# Patient Record
Sex: FEMALE | Race: WHITE | NOT HISPANIC OR LATINO
[De-identification: names, ages, dates, MRNs, and addresses within clinical notes are randomized per-mention and may not be internally consistent; named-entity substitution may affect disease eponyms.]

---

## 2017-01-16 ENCOUNTER — FORM ENCOUNTER (OUTPATIENT)
Age: 72
End: 2017-01-16

## 2017-01-17 ENCOUNTER — APPOINTMENT (OUTPATIENT)
Dept: ULTRASOUND IMAGING | Facility: IMAGING CENTER | Age: 72
End: 2017-01-17

## 2017-01-17 ENCOUNTER — OUTPATIENT (OUTPATIENT)
Dept: OUTPATIENT SERVICES | Facility: HOSPITAL | Age: 72
LOS: 1 days | End: 2017-01-17
Payer: COMMERCIAL

## 2017-01-17 DIAGNOSIS — Z98.89 OTHER SPECIFIED POSTPROCEDURAL STATES: Chronic | ICD-10-CM

## 2017-01-17 DIAGNOSIS — C50.919 MALIGNANT NEOPLASM OF UNSPECIFIED SITE OF UNSPECIFIED FEMALE BREAST: ICD-10-CM

## 2017-01-17 PROCEDURE — 76641 ULTRASOUND BREAST COMPLETE: CPT

## 2017-02-06 ENCOUNTER — APPOINTMENT (OUTPATIENT)
Dept: SURGICAL ONCOLOGY | Facility: CLINIC | Age: 72
End: 2017-02-06

## 2017-02-06 VITALS
HEART RATE: 82 BPM | HEIGHT: 55 IN | SYSTOLIC BLOOD PRESSURE: 149 MMHG | BODY MASS INDEX: 21.98 KG/M2 | RESPIRATION RATE: 14 BRPM | DIASTOLIC BLOOD PRESSURE: 78 MMHG | WEIGHT: 95 LBS

## 2017-03-17 ENCOUNTER — OUTPATIENT (OUTPATIENT)
Dept: OUTPATIENT SERVICES | Facility: HOSPITAL | Age: 72
LOS: 1 days | Discharge: HOME | End: 2017-03-17

## 2017-03-17 DIAGNOSIS — Z98.89 OTHER SPECIFIED POSTPROCEDURAL STATES: Chronic | ICD-10-CM

## 2017-03-17 DIAGNOSIS — C50.919 MALIGNANT NEOPLASM OF UNSPECIFIED SITE OF UNSPECIFIED FEMALE BREAST: ICD-10-CM

## 2017-03-19 ENCOUNTER — RESULT REVIEW (OUTPATIENT)
Age: 72
End: 2017-03-19

## 2017-03-20 ENCOUNTER — APPOINTMENT (OUTPATIENT)
Dept: HEMATOLOGY ONCOLOGY | Facility: CLINIC | Age: 72
End: 2017-03-20

## 2017-03-20 VITALS
SYSTOLIC BLOOD PRESSURE: 120 MMHG | BODY MASS INDEX: 23.84 KG/M2 | OXYGEN SATURATION: 97 % | DIASTOLIC BLOOD PRESSURE: 86 MMHG | WEIGHT: 95.24 LBS | TEMPERATURE: 98.8 F | RESPIRATION RATE: 16 BRPM | HEART RATE: 81 BPM

## 2017-03-20 LAB
HCT VFR BLD CALC: 32.7 % — LOW (ref 34.5–45)
HGB BLD-MCNC: 11.4 G/DL — LOW (ref 11.5–15.5)
MCHC RBC-ENTMCNC: 33.1 PG — SIGNIFICANT CHANGE UP (ref 27–34)
MCHC RBC-ENTMCNC: 34.8 G/DL — SIGNIFICANT CHANGE UP (ref 32–36)
MCV RBC AUTO: 95.1 FL — SIGNIFICANT CHANGE UP (ref 80–100)
PLATELET # BLD AUTO: 237 K/UL — SIGNIFICANT CHANGE UP (ref 150–400)
RBC # BLD: 3.44 M/UL — LOW (ref 3.8–5.2)
RBC # FLD: 12.2 % — SIGNIFICANT CHANGE UP (ref 10.3–14.5)
WBC # BLD: 7.3 K/UL — SIGNIFICANT CHANGE UP (ref 3.8–10.5)
WBC # FLD AUTO: 7.3 K/UL — SIGNIFICANT CHANGE UP (ref 3.8–10.5)

## 2017-03-21 LAB
25(OH)D3 SERPL-MCNC: 62.1 NG/ML
ALBUMIN SERPL ELPH-MCNC: 4.2 G/DL
ALP BLD-CCNC: 46 U/L
ALT SERPL-CCNC: 19 U/L
ANION GAP SERPL CALC-SCNC: 15 MMOL/L
AST SERPL-CCNC: 19 U/L
BILIRUB SERPL-MCNC: 0.3 MG/DL
BUN SERPL-MCNC: 31 MG/DL
CALCIUM SERPL-MCNC: 9.6 MG/DL
CHLORIDE SERPL-SCNC: 102 MMOL/L
CO2 SERPL-SCNC: 24 MMOL/L
CREAT SERPL-MCNC: 0.59 MG/DL
GLUCOSE SERPL-MCNC: 110 MG/DL
POTASSIUM SERPL-SCNC: 4.3 MMOL/L
PROT SERPL-MCNC: 6.7 G/DL
SODIUM SERPL-SCNC: 141 MMOL/L

## 2017-07-26 ENCOUNTER — FORM ENCOUNTER (OUTPATIENT)
Age: 72
End: 2017-07-26

## 2017-07-27 ENCOUNTER — OUTPATIENT (OUTPATIENT)
Dept: OUTPATIENT SERVICES | Facility: HOSPITAL | Age: 72
LOS: 1 days | End: 2017-07-27
Payer: COMMERCIAL

## 2017-07-27 ENCOUNTER — APPOINTMENT (OUTPATIENT)
Dept: MAMMOGRAPHY | Facility: IMAGING CENTER | Age: 72
End: 2017-07-27
Payer: COMMERCIAL

## 2017-07-27 DIAGNOSIS — Z98.89 OTHER SPECIFIED POSTPROCEDURAL STATES: Chronic | ICD-10-CM

## 2017-07-27 DIAGNOSIS — C50.919 MALIGNANT NEOPLASM OF UNSPECIFIED SITE OF UNSPECIFIED FEMALE BREAST: ICD-10-CM

## 2017-07-27 PROCEDURE — 77063 BREAST TOMOSYNTHESIS BI: CPT | Mod: 26

## 2017-07-27 PROCEDURE — 77063 BREAST TOMOSYNTHESIS BI: CPT

## 2017-07-27 PROCEDURE — 77067 SCR MAMMO BI INCL CAD: CPT

## 2017-07-27 PROCEDURE — G0202: CPT | Mod: 26

## 2017-09-13 ENCOUNTER — OUTPATIENT (OUTPATIENT)
Dept: OUTPATIENT SERVICES | Facility: HOSPITAL | Age: 72
LOS: 1 days | Discharge: HOME | End: 2017-09-13

## 2017-09-13 DIAGNOSIS — Z98.89 OTHER SPECIFIED POSTPROCEDURAL STATES: Chronic | ICD-10-CM

## 2017-09-13 DIAGNOSIS — C50.919 MALIGNANT NEOPLASM OF UNSPECIFIED SITE OF UNSPECIFIED FEMALE BREAST: ICD-10-CM

## 2017-09-18 ENCOUNTER — APPOINTMENT (OUTPATIENT)
Dept: HEMATOLOGY ONCOLOGY | Facility: CLINIC | Age: 72
End: 2017-09-18
Payer: COMMERCIAL

## 2017-09-18 VITALS
HEART RATE: 71 BPM | DIASTOLIC BLOOD PRESSURE: 84 MMHG | OXYGEN SATURATION: 98 % | BODY MASS INDEX: 23.53 KG/M2 | WEIGHT: 93.98 LBS | RESPIRATION RATE: 16 BRPM | SYSTOLIC BLOOD PRESSURE: 144 MMHG | TEMPERATURE: 98.2 F

## 2017-09-18 PROCEDURE — 99214 OFFICE O/P EST MOD 30 MIN: CPT

## 2017-11-21 ENCOUNTER — MEDICATION RENEWAL (OUTPATIENT)
Age: 72
End: 2017-11-21

## 2017-12-07 ENCOUNTER — APPOINTMENT (OUTPATIENT)
Dept: FAMILY MEDICINE | Facility: CLINIC | Age: 72
End: 2017-12-07
Payer: COMMERCIAL

## 2017-12-07 VITALS
DIASTOLIC BLOOD PRESSURE: 82 MMHG | SYSTOLIC BLOOD PRESSURE: 112 MMHG | WEIGHT: 95 LBS | BODY MASS INDEX: 21.98 KG/M2 | HEIGHT: 55 IN

## 2017-12-07 PROCEDURE — 99213 OFFICE O/P EST LOW 20 MIN: CPT

## 2017-12-07 RX ORDER — IODOQUINOL, HYDROCORTISONE ACETATE AND ALOE VERA LEAF 10; 20; 10 MG/G; MG/G; MG/G
1-2-1 GEL TOPICAL
Qty: 48 | Refills: 0 | Status: ACTIVE | COMMUNITY
Start: 2017-08-02

## 2017-12-26 ENCOUNTER — MEDICATION RENEWAL (OUTPATIENT)
Age: 72
End: 2017-12-26

## 2017-12-29 ENCOUNTER — APPOINTMENT (OUTPATIENT)
Dept: FAMILY MEDICINE | Facility: CLINIC | Age: 72
End: 2017-12-29
Payer: COMMERCIAL

## 2017-12-29 VITALS
HEIGHT: 55 IN | WEIGHT: 93 LBS | DIASTOLIC BLOOD PRESSURE: 80 MMHG | SYSTOLIC BLOOD PRESSURE: 130 MMHG | BODY MASS INDEX: 21.52 KG/M2

## 2017-12-29 PROCEDURE — 99213 OFFICE O/P EST LOW 20 MIN: CPT

## 2018-01-30 ENCOUNTER — FORM ENCOUNTER (OUTPATIENT)
Age: 73
End: 2018-01-30

## 2018-01-31 ENCOUNTER — OUTPATIENT (OUTPATIENT)
Dept: OUTPATIENT SERVICES | Facility: HOSPITAL | Age: 73
LOS: 1 days | End: 2018-01-31
Payer: COMMERCIAL

## 2018-01-31 ENCOUNTER — APPOINTMENT (OUTPATIENT)
Dept: ULTRASOUND IMAGING | Facility: IMAGING CENTER | Age: 73
End: 2018-01-31
Payer: COMMERCIAL

## 2018-01-31 DIAGNOSIS — Z98.89 OTHER SPECIFIED POSTPROCEDURAL STATES: Chronic | ICD-10-CM

## 2018-01-31 DIAGNOSIS — C50.919 MALIGNANT NEOPLASM OF UNSPECIFIED SITE OF UNSPECIFIED FEMALE BREAST: ICD-10-CM

## 2018-01-31 PROCEDURE — 76641 ULTRASOUND BREAST COMPLETE: CPT | Mod: 26,50

## 2018-01-31 PROCEDURE — 76641 ULTRASOUND BREAST COMPLETE: CPT

## 2018-02-05 ENCOUNTER — APPOINTMENT (OUTPATIENT)
Dept: SURGICAL ONCOLOGY | Facility: CLINIC | Age: 73
End: 2018-02-05
Payer: COMMERCIAL

## 2018-02-05 VITALS
DIASTOLIC BLOOD PRESSURE: 80 MMHG | RESPIRATION RATE: 15 BRPM | WEIGHT: 93 LBS | HEART RATE: 93 BPM | SYSTOLIC BLOOD PRESSURE: 178 MMHG | HEIGHT: 55 IN | BODY MASS INDEX: 21.52 KG/M2

## 2018-02-05 PROCEDURE — 99213 OFFICE O/P EST LOW 20 MIN: CPT

## 2018-03-08 ENCOUNTER — OUTPATIENT (OUTPATIENT)
Dept: OUTPATIENT SERVICES | Facility: HOSPITAL | Age: 73
LOS: 1 days | Discharge: HOME | End: 2018-03-08

## 2018-03-08 DIAGNOSIS — C50.919 MALIGNANT NEOPLASM OF UNSPECIFIED SITE OF UNSPECIFIED FEMALE BREAST: ICD-10-CM

## 2018-03-08 DIAGNOSIS — Z98.89 OTHER SPECIFIED POSTPROCEDURAL STATES: Chronic | ICD-10-CM

## 2018-03-12 ENCOUNTER — APPOINTMENT (OUTPATIENT)
Dept: HEMATOLOGY ONCOLOGY | Facility: CLINIC | Age: 73
End: 2018-03-12
Payer: COMMERCIAL

## 2018-04-10 ENCOUNTER — OUTPATIENT (OUTPATIENT)
Dept: OUTPATIENT SERVICES | Facility: HOSPITAL | Age: 73
LOS: 1 days | Discharge: HOME | End: 2018-04-10

## 2018-04-10 DIAGNOSIS — C50.919 MALIGNANT NEOPLASM OF UNSPECIFIED SITE OF UNSPECIFIED FEMALE BREAST: ICD-10-CM

## 2018-04-10 DIAGNOSIS — Z98.89 OTHER SPECIFIED POSTPROCEDURAL STATES: Chronic | ICD-10-CM

## 2018-04-13 ENCOUNTER — APPOINTMENT (OUTPATIENT)
Dept: HEMATOLOGY ONCOLOGY | Facility: CLINIC | Age: 73
End: 2018-04-13
Payer: COMMERCIAL

## 2018-04-13 ENCOUNTER — RESULT REVIEW (OUTPATIENT)
Age: 73
End: 2018-04-13

## 2018-04-13 VITALS
OXYGEN SATURATION: 100 % | SYSTOLIC BLOOD PRESSURE: 182 MMHG | TEMPERATURE: 98.2 F | DIASTOLIC BLOOD PRESSURE: 76 MMHG | HEART RATE: 84 BPM | BODY MASS INDEX: 23.23 KG/M2 | WEIGHT: 92.81 LBS | RESPIRATION RATE: 16 BRPM

## 2018-04-13 LAB
HCT VFR BLD CALC: 32.6 % — LOW (ref 34.5–45)
HGB BLD-MCNC: 11.3 G/DL — LOW (ref 11.5–15.5)
MCHC RBC-ENTMCNC: 32.5 PG — SIGNIFICANT CHANGE UP (ref 27–34)
MCHC RBC-ENTMCNC: 34.7 G/DL — SIGNIFICANT CHANGE UP (ref 32–36)
MCV RBC AUTO: 93.5 FL — SIGNIFICANT CHANGE UP (ref 80–100)
PLATELET # BLD AUTO: 282 K/UL — SIGNIFICANT CHANGE UP (ref 150–400)
RBC # BLD: 3.49 M/UL — LOW (ref 3.8–5.2)
RBC # FLD: 12.8 % — SIGNIFICANT CHANGE UP (ref 10.3–14.5)
WBC # BLD: 5.5 K/UL — SIGNIFICANT CHANGE UP (ref 3.8–10.5)
WBC # FLD AUTO: 5.5 K/UL — SIGNIFICANT CHANGE UP (ref 3.8–10.5)

## 2018-04-13 PROCEDURE — 99214 OFFICE O/P EST MOD 30 MIN: CPT

## 2018-04-15 LAB
25(OH)D3 SERPL-MCNC: 59.3 NG/ML
ALBUMIN SERPL ELPH-MCNC: 4.5 G/DL
ALP BLD-CCNC: 48 U/L
ALT SERPL-CCNC: 26 U/L
ANION GAP SERPL CALC-SCNC: 13 MMOL/L
AST SERPL-CCNC: 23 U/L
BILIRUB SERPL-MCNC: 0.3 MG/DL
BUN SERPL-MCNC: 12 MG/DL
CALCIUM SERPL-MCNC: 10 MG/DL
CHLORIDE SERPL-SCNC: 99 MMOL/L
CO2 SERPL-SCNC: 26 MMOL/L
CREAT SERPL-MCNC: 0.65 MG/DL
GLUCOSE SERPL-MCNC: 99 MG/DL
POTASSIUM SERPL-SCNC: 4.6 MMOL/L
PROT SERPL-MCNC: 6.7 G/DL
SODIUM SERPL-SCNC: 138 MMOL/L

## 2018-05-03 ENCOUNTER — APPOINTMENT (OUTPATIENT)
Dept: OBGYN | Facility: CLINIC | Age: 73
End: 2018-05-03
Payer: COMMERCIAL

## 2018-05-03 VITALS
BODY MASS INDEX: 21.52 KG/M2 | DIASTOLIC BLOOD PRESSURE: 78 MMHG | WEIGHT: 93 LBS | SYSTOLIC BLOOD PRESSURE: 122 MMHG | HEIGHT: 55 IN

## 2018-05-03 PROCEDURE — 99397 PER PM REEVAL EST PAT 65+ YR: CPT

## 2018-07-11 ENCOUNTER — FORM ENCOUNTER (OUTPATIENT)
Age: 73
End: 2018-07-11

## 2018-07-12 ENCOUNTER — APPOINTMENT (OUTPATIENT)
Dept: MAMMOGRAPHY | Facility: IMAGING CENTER | Age: 73
End: 2018-07-12
Payer: COMMERCIAL

## 2018-07-12 ENCOUNTER — APPOINTMENT (OUTPATIENT)
Dept: ULTRASOUND IMAGING | Facility: IMAGING CENTER | Age: 73
End: 2018-07-12
Payer: COMMERCIAL

## 2018-07-12 ENCOUNTER — OUTPATIENT (OUTPATIENT)
Dept: OUTPATIENT SERVICES | Facility: HOSPITAL | Age: 73
LOS: 1 days | End: 2018-07-12
Payer: COMMERCIAL

## 2018-07-12 DIAGNOSIS — C50.919 MALIGNANT NEOPLASM OF UNSPECIFIED SITE OF UNSPECIFIED FEMALE BREAST: ICD-10-CM

## 2018-07-12 DIAGNOSIS — Z98.89 OTHER SPECIFIED POSTPROCEDURAL STATES: Chronic | ICD-10-CM

## 2018-07-12 PROCEDURE — 77063 BREAST TOMOSYNTHESIS BI: CPT

## 2018-07-12 PROCEDURE — 77067 SCR MAMMO BI INCL CAD: CPT | Mod: 26

## 2018-07-12 PROCEDURE — 77063 BREAST TOMOSYNTHESIS BI: CPT | Mod: 26

## 2018-07-12 PROCEDURE — G0279: CPT

## 2018-07-12 PROCEDURE — 77067 SCR MAMMO BI INCL CAD: CPT

## 2018-07-12 PROCEDURE — 77066 DX MAMMO INCL CAD BI: CPT

## 2018-07-12 PROCEDURE — 76642 ULTRASOUND BREAST LIMITED: CPT | Mod: 26,RT

## 2018-07-12 PROCEDURE — 76642 ULTRASOUND BREAST LIMITED: CPT

## 2018-08-01 ENCOUNTER — APPOINTMENT (OUTPATIENT)
Dept: ULTRASOUND IMAGING | Facility: IMAGING CENTER | Age: 73
End: 2018-08-01

## 2018-10-09 ENCOUNTER — OUTPATIENT (OUTPATIENT)
Dept: OUTPATIENT SERVICES | Facility: HOSPITAL | Age: 73
LOS: 1 days | Discharge: HOME | End: 2018-10-09

## 2018-10-09 DIAGNOSIS — Z98.89 OTHER SPECIFIED POSTPROCEDURAL STATES: Chronic | ICD-10-CM

## 2018-10-09 DIAGNOSIS — C50.919 MALIGNANT NEOPLASM OF UNSPECIFIED SITE OF UNSPECIFIED FEMALE BREAST: ICD-10-CM

## 2018-10-17 ENCOUNTER — RESULT REVIEW (OUTPATIENT)
Age: 73
End: 2018-10-17

## 2018-10-17 ENCOUNTER — APPOINTMENT (OUTPATIENT)
Dept: HEMATOLOGY ONCOLOGY | Facility: CLINIC | Age: 73
End: 2018-10-17
Payer: COMMERCIAL

## 2018-10-17 VITALS
DIASTOLIC BLOOD PRESSURE: 87 MMHG | WEIGHT: 91.49 LBS | TEMPERATURE: 97.5 F | SYSTOLIC BLOOD PRESSURE: 138 MMHG | BODY MASS INDEX: 22.9 KG/M2 | RESPIRATION RATE: 16 BRPM

## 2018-10-17 LAB
HCT VFR BLD CALC: 34.1 % — LOW (ref 34.5–45)
HGB BLD-MCNC: 11.6 G/DL — SIGNIFICANT CHANGE UP (ref 11.5–15.5)
MCHC RBC-ENTMCNC: 31.6 PG — SIGNIFICANT CHANGE UP (ref 27–34)
MCHC RBC-ENTMCNC: 34.1 G/DL — SIGNIFICANT CHANGE UP (ref 32–36)
MCV RBC AUTO: 92.7 FL — SIGNIFICANT CHANGE UP (ref 80–100)
PLATELET # BLD AUTO: 325 K/UL — SIGNIFICANT CHANGE UP (ref 150–400)
RBC # BLD: 3.68 M/UL — LOW (ref 3.8–5.2)
RBC # FLD: 12.8 % — SIGNIFICANT CHANGE UP (ref 10.3–14.5)
WBC # BLD: 6.8 K/UL — SIGNIFICANT CHANGE UP (ref 3.8–10.5)
WBC # FLD AUTO: 6.8 K/UL — SIGNIFICANT CHANGE UP (ref 3.8–10.5)

## 2018-10-17 PROCEDURE — 99214 OFFICE O/P EST MOD 30 MIN: CPT

## 2018-10-17 RX ORDER — AZITHROMYCIN 250 MG/1
250 TABLET, FILM COATED ORAL
Qty: 1 | Refills: 0 | Status: DISCONTINUED | COMMUNITY
Start: 2017-12-29 | End: 2018-10-17

## 2018-10-17 RX ORDER — PREDNISONE 20 MG/1
20 TABLET ORAL
Qty: 18 | Refills: 0 | Status: DISCONTINUED | COMMUNITY
Start: 2017-12-29 | End: 2018-10-17

## 2018-10-17 RX ORDER — BENZONATATE 200 MG/1
200 CAPSULE ORAL 3 TIMES DAILY
Qty: 30 | Refills: 0 | Status: DISCONTINUED | COMMUNITY
Start: 2017-12-07 | End: 2018-10-17

## 2018-10-17 RX ORDER — MOMETASONE FUROATE 220 UG/1
220 INHALANT RESPIRATORY (INHALATION) DAILY
Qty: 1 | Refills: 0 | Status: DISCONTINUED | COMMUNITY
Start: 2017-12-07 | End: 2018-10-17

## 2018-10-19 LAB
25(OH)D3 SERPL-MCNC: 55 NG/ML
ALBUMIN SERPL ELPH-MCNC: 4.3 G/DL
ALP BLD-CCNC: 55 U/L
ALT SERPL-CCNC: 41 U/L
ANION GAP SERPL CALC-SCNC: 17 MMOL/L
AST SERPL-CCNC: 38 U/L
BILIRUB SERPL-MCNC: 0.3 MG/DL
BUN SERPL-MCNC: 11 MG/DL
CALCIUM SERPL-MCNC: 9.4 MG/DL
CHLORIDE SERPL-SCNC: 92 MMOL/L
CO2 SERPL-SCNC: 23 MMOL/L
CREAT SERPL-MCNC: 0.56 MG/DL
GLUCOSE SERPL-MCNC: 136 MG/DL
POTASSIUM SERPL-SCNC: 4.4 MMOL/L
PROT SERPL-MCNC: 6.5 G/DL
SODIUM SERPL-SCNC: 132 MMOL/L

## 2018-11-18 ENCOUNTER — APPOINTMENT (OUTPATIENT)
Dept: FAMILY MEDICINE | Facility: CLINIC | Age: 73
End: 2018-11-18
Payer: COMMERCIAL

## 2018-11-18 VITALS
WEIGHT: 91 LBS | DIASTOLIC BLOOD PRESSURE: 80 MMHG | HEIGHT: 55 IN | BODY MASS INDEX: 21.06 KG/M2 | SYSTOLIC BLOOD PRESSURE: 120 MMHG

## 2018-11-18 PROCEDURE — G0008: CPT

## 2018-11-18 PROCEDURE — 99213 OFFICE O/P EST LOW 20 MIN: CPT | Mod: 25

## 2018-11-18 PROCEDURE — 90686 IIV4 VACC NO PRSV 0.5 ML IM: CPT

## 2018-11-18 RX ORDER — LOSARTAN POTASSIUM 25 MG/1
25 TABLET, FILM COATED ORAL
Refills: 0 | Status: DISCONTINUED | COMMUNITY
Start: 2018-10-17 | End: 2018-11-18

## 2019-02-04 ENCOUNTER — APPOINTMENT (OUTPATIENT)
Dept: SURGICAL ONCOLOGY | Facility: CLINIC | Age: 74
End: 2019-02-04
Payer: COMMERCIAL

## 2019-02-04 VITALS
DIASTOLIC BLOOD PRESSURE: 79 MMHG | WEIGHT: 90 LBS | OXYGEN SATURATION: 98 % | HEIGHT: 55 IN | HEART RATE: 89 BPM | SYSTOLIC BLOOD PRESSURE: 142 MMHG | BODY MASS INDEX: 20.83 KG/M2

## 2019-02-04 PROCEDURE — 99213 OFFICE O/P EST LOW 20 MIN: CPT

## 2019-02-04 NOTE — PHYSICAL EXAM
[Normal] : supple, no neck mass and thyroid not enlarged [Normal Neck Lymph Nodes] : normal neck lymph nodes  [Normal Supraclavicular Lymph Nodes] : normal supraclavicular lymph nodes [Normal Axillary Lymph Nodes] : normal axillary lymph nodes [Normal] : normal appearance, no rash, nodules, vesicles, ulcers, erythema [de-identified] : groins not examined [de-identified] : below

## 2019-02-04 NOTE — REVIEW OF SYSTEMS
[Negative] : Integumentary [FreeTextEntry5] : hypertension [FreeTextEntry6] : Asthma [FreeTextEntry7] : penicillin allergy [de-identified] : DM [FreeTextEntry1] : Breast cancer

## 2019-02-04 NOTE — HISTORY OF PRESENT ILLNESS
[de-identified] : 73 year-old nulliparous lady who we see for followup of breast cancer.\par \par She is adopted, and family history is not available\par \par 2000 she had left breast conserving surgery for stage I carcinoma by [facility], followed by radiation at our facility, and anti-estrogen therapy under the care of Dr Tamayo.\par \par She developed a left axillary recurrence in August 2014 which I resected, and was followed by radiation under the guidance of Dr. Shaila Basilio.\par \par She was seen by Dr. Kostas Marrero - On anastrozole.\par October 2018 followup visit  Janes was unremarkable\par \par November 2003: LCIS and ADH on a right breast biopsy\par \par \par Her internist is Dr. Cale Michelle.\par \par No pacemaker or defibrillator\par \par Presently on anastrozole\par Asthma is managed by her PMD with Asmanex Ventolin, and Singulair.\par Daily baby aspirin.\par Amlodipine for hypertension.\par Hypercholesterolemia treated with Lipitor.\par DIABETES controlled with Janumet\par \par Her gynecologist is Dr. Nuzhat Smiley, a visit in May 2018 was unremarkable.\par \par January 2017 she had a colonoscopy which was normal, by Dr. Rebel Xavier\par \par CC:none

## 2019-04-30 ENCOUNTER — OUTPATIENT (OUTPATIENT)
Dept: OUTPATIENT SERVICES | Facility: HOSPITAL | Age: 74
LOS: 1 days | Discharge: HOME | End: 2019-04-30

## 2019-04-30 DIAGNOSIS — Z98.89 OTHER SPECIFIED POSTPROCEDURAL STATES: Chronic | ICD-10-CM

## 2019-04-30 DIAGNOSIS — C50.919 MALIGNANT NEOPLASM OF UNSPECIFIED SITE OF UNSPECIFIED FEMALE BREAST: ICD-10-CM

## 2019-05-03 ENCOUNTER — APPOINTMENT (OUTPATIENT)
Dept: HEMATOLOGY ONCOLOGY | Facility: CLINIC | Age: 74
End: 2019-05-03
Payer: COMMERCIAL

## 2019-05-03 VITALS
OXYGEN SATURATION: 98 % | TEMPERATURE: 98.1 F | DIASTOLIC BLOOD PRESSURE: 81 MMHG | SYSTOLIC BLOOD PRESSURE: 143 MMHG | RESPIRATION RATE: 16 BRPM | WEIGHT: 89.29 LBS | HEART RATE: 80 BPM | BODY MASS INDEX: 22.35 KG/M2

## 2019-05-03 PROCEDURE — 99214 OFFICE O/P EST MOD 30 MIN: CPT

## 2019-05-03 RX ORDER — AMLODIPINE BESYLATE 2.5 MG/1
2.5 TABLET ORAL
Refills: 0 | Status: DISCONTINUED | COMMUNITY
Start: 2018-11-18 | End: 2019-05-03

## 2019-05-03 NOTE — HISTORY OF PRESENT ILLNESS
[Disease: _____________________] : Disease: [unfilled] [AJCC Stage: ____] : AJCC Stage: [unfilled] [de-identified] : She was initially evaluated in 10/2014 for left breast cancer.  She had a history of multifocal lobular carcinoma in situ and had left lumpectomy with axillary lymph node dissection in 4/25/2000.  She received radiation to the breast at [facility] and was treated with tamoxifen for 5 years with Dr Tamayo in [place].  On 7/2014, she had abnormal mammogram findings and she had FNA performed at the 1:00 axillary nodule which was positive for malignant cells.  On 8/29/2014, she had had left lymph node biopsy  that showed 1/2 lymph nodes positive for disease that was ER 90%, AR <5%, Jyf8eel negative.  She had PET/ CT performed on 10/2014 and MRI of the breast which showed post surgical changes to the left axilla but could not identify disease focus.  We reviewed chemotherapy options for LN positive breast cancer which she refused.  We offered her endocrine therapy and referred to radiation evaluation.  She completed radiation with Dr Basilio and started on anastrozole since 1/2015.  [de-identified] : ER >90%, NE <5%, Ido9poc negative [de-identified] : Anastrozole 1/2015 to present  [de-identified] : She denies any new breast or chest wall changes. She will be getting mammogram/ sonogram in July. Will also get bone density in July. She denies any new joint pain or worsening stiffness. She tolerates anastrozole and willing to continue with therapy. Her amlodipine has been increased to 5mg: mild ankle swelling. No new medications or health issues. She had blood work done with endocrine with LFTs WNL.

## 2019-05-03 NOTE — ASSESSMENT
[FreeTextEntry1] : She is a 72 y/o F with recurrent left axillary LN breast cancer s/p resection and radiation. She has been on anastrozole since 1/2015 without any new signs or symptoms of breast cancer recurrence. She will get bone density done with her endocrinologist at [facility] and mammo/ sono in July. Next follow up in 6 months.

## 2019-05-03 NOTE — PHYSICAL EXAM
[Fully active, able to carry on all pre-disease performance without restriction] : Status 0 - Fully active, able to carry on all pre-disease performance without restriction [Normal] : affect appropriate [de-identified] : inspiratory wheeze R; clear no crackles  [de-identified] : no abnl masses palpable or axillary LN palpable

## 2019-07-15 ENCOUNTER — TRANSCRIPTION ENCOUNTER (OUTPATIENT)
Age: 74
End: 2019-07-15

## 2019-07-16 ENCOUNTER — FORM ENCOUNTER (OUTPATIENT)
Age: 74
End: 2019-07-16

## 2019-07-17 ENCOUNTER — APPOINTMENT (OUTPATIENT)
Dept: MAMMOGRAPHY | Facility: IMAGING CENTER | Age: 74
End: 2019-07-17
Payer: COMMERCIAL

## 2019-07-17 ENCOUNTER — OUTPATIENT (OUTPATIENT)
Dept: OUTPATIENT SERVICES | Facility: HOSPITAL | Age: 74
LOS: 1 days | End: 2019-07-17
Payer: COMMERCIAL

## 2019-07-17 ENCOUNTER — APPOINTMENT (OUTPATIENT)
Dept: ULTRASOUND IMAGING | Facility: IMAGING CENTER | Age: 74
End: 2019-07-17
Payer: COMMERCIAL

## 2019-07-17 DIAGNOSIS — C50.919 MALIGNANT NEOPLASM OF UNSPECIFIED SITE OF UNSPECIFIED FEMALE BREAST: ICD-10-CM

## 2019-07-17 DIAGNOSIS — Z98.89 OTHER SPECIFIED POSTPROCEDURAL STATES: Chronic | ICD-10-CM

## 2019-07-17 PROCEDURE — 77067 SCR MAMMO BI INCL CAD: CPT

## 2019-07-17 PROCEDURE — 77067 SCR MAMMO BI INCL CAD: CPT | Mod: 26

## 2019-07-17 PROCEDURE — 76641 ULTRASOUND BREAST COMPLETE: CPT | Mod: 26,50

## 2019-07-17 PROCEDURE — 76641 ULTRASOUND BREAST COMPLETE: CPT

## 2019-07-17 PROCEDURE — 77063 BREAST TOMOSYNTHESIS BI: CPT | Mod: 26

## 2019-07-17 PROCEDURE — 77063 BREAST TOMOSYNTHESIS BI: CPT

## 2019-07-17 PROCEDURE — 77066 DX MAMMO INCL CAD BI: CPT

## 2019-07-17 PROCEDURE — G0279: CPT

## 2019-09-11 ENCOUNTER — TRANSCRIPTION ENCOUNTER (OUTPATIENT)
Age: 74
End: 2019-09-11

## 2019-10-02 ENCOUNTER — OUTPATIENT (OUTPATIENT)
Dept: OUTPATIENT SERVICES | Facility: HOSPITAL | Age: 74
LOS: 1 days | Discharge: HOME | End: 2019-10-02

## 2019-10-02 DIAGNOSIS — Z98.89 OTHER SPECIFIED POSTPROCEDURAL STATES: Chronic | ICD-10-CM

## 2019-10-02 DIAGNOSIS — C50.919 MALIGNANT NEOPLASM OF UNSPECIFIED SITE OF UNSPECIFIED FEMALE BREAST: ICD-10-CM

## 2019-10-04 ENCOUNTER — APPOINTMENT (OUTPATIENT)
Dept: HEMATOLOGY ONCOLOGY | Facility: CLINIC | Age: 74
End: 2019-10-04
Payer: COMMERCIAL

## 2019-10-04 VITALS
WEIGHT: 89.29 LBS | BODY MASS INDEX: 22.35 KG/M2 | OXYGEN SATURATION: 96 % | RESPIRATION RATE: 16 BRPM | DIASTOLIC BLOOD PRESSURE: 73 MMHG | SYSTOLIC BLOOD PRESSURE: 113 MMHG | TEMPERATURE: 98.3 F | HEART RATE: 82 BPM

## 2019-10-04 PROCEDURE — 99213 OFFICE O/P EST LOW 20 MIN: CPT

## 2019-10-04 NOTE — ASSESSMENT
[FreeTextEntry1] : She is a 74 y/o F with recurrent left axillary LN breast cancer s/p resection and radiation. She has been on anastrozole since 1/2015 without any new signs or symptoms of breast cancer recurrence. We reviewed her bone density which is stable. She will continue with calcium and Vitamin D with exercise to maintain bone health. She will continue with anastrozole. Next follow up in 6 months.

## 2019-10-04 NOTE — PHYSICAL EXAM
[Fully active, able to carry on all pre-disease performance without restriction] : Status 0 - Fully active, able to carry on all pre-disease performance without restriction [Normal] : affect appropriate [de-identified] : inspiratory wheeze R; clear no crackles  [de-identified] : no abnl masses palpable or axillary LN palpable

## 2019-10-04 NOTE — HISTORY OF PRESENT ILLNESS
[Disease: _____________________] : Disease: [unfilled] [AJCC Stage: ____] : AJCC Stage: [unfilled] [de-identified] : ER >90%, NV <5%, Clg8twg negative [de-identified] : She was initially evaluated in 10/2014 for left breast cancer.  She had a history of multifocal lobular carcinoma in situ and had left lumpectomy with axillary lymph node dissection in 4/25/2000.  She received radiation to the breast at [facility] and was treated with tamoxifen for 5 years with Dr Tamayo in [place].  On 7/2014, she had abnormal mammogram findings and she had FNA performed at the 1:00 axillary nodule which was positive for malignant cells.  On 8/29/2014, she had had left lymph node biopsy  that showed 1/2 lymph nodes positive for disease that was ER 90%, NH <5%, Dxm2npc negative.  She had PET/ CT performed on 10/2014 and MRI of the breast which showed post surgical changes to the left axilla but could not identify disease focus.  We reviewed chemotherapy options for LN positive breast cancer which she refused.  We offered her endocrine therapy and referred to radiation evaluation.  She completed radiation with Dr Basilio and started on anastrozole since 1/2015.  [de-identified] : Anastrozole 1/2015 to present  [de-identified] : She continues to tolerate anastrozole well. She denies any new breast changes. She is up to date with breast imaging and follow up with Dr Rehman. She also had bone density done recently which shows stable osteopenia. She denies any new medications.

## 2019-10-29 NOTE — ASSESSMENT
[FreeTextEntry1] : October 2014 breast MRI and PET scan were unremarkable.\par \par Annual breast imaging will be July 2019, prescription entered today for our location.\par \par If asymptomatic, with normal imaging, we will see her in another year, sooner if needed
complains of pain/discomfort

## 2020-01-09 ENCOUNTER — APPOINTMENT (OUTPATIENT)
Dept: OBGYN | Facility: CLINIC | Age: 75
End: 2020-01-09
Payer: COMMERCIAL

## 2020-01-09 ENCOUNTER — LABORATORY RESULT (OUTPATIENT)
Age: 75
End: 2020-01-09

## 2020-01-09 VITALS
DIASTOLIC BLOOD PRESSURE: 76 MMHG | WEIGHT: 85 LBS | HEIGHT: 55 IN | SYSTOLIC BLOOD PRESSURE: 121 MMHG | BODY MASS INDEX: 19.67 KG/M2

## 2020-01-09 DIAGNOSIS — Z01.419 ENCOUNTER FOR GYNECOLOGICAL EXAMINATION (GENERAL) (ROUTINE) W/OUT ABNORMAL FINDINGS: ICD-10-CM

## 2020-01-09 PROCEDURE — 99397 PER PM REEVAL EST PAT 65+ YR: CPT

## 2020-01-09 NOTE — PHYSICAL EXAM
[Awake] : awake [Alert] : alert [Acute Distress] : no acute distress [Nipple Discharge] : no nipple discharge [Mass] : no breast mass [Axillary LAD] : no axillary lymphadenopathy [Soft] : soft [Oriented x3] : oriented to person, place, and time [Distended] : not distended [Tender] : non tender [Depressed Mood] : not depressed [Flat Affect] : affect not flat [Normal] : cervix [No Bleeding] : there was no active vaginal bleeding [Uterine Adnexae] : were not tender and not enlarged [Ovarian Mass (___ Cm)] : there were no adnexal masses [Adnexa Tenderness] : were not tender [CTAB] : CTAB [RRR, No Murmurs] : RRR, no murmurs

## 2020-02-13 ENCOUNTER — APPOINTMENT (OUTPATIENT)
Dept: SURGICAL ONCOLOGY | Facility: CLINIC | Age: 75
End: 2020-02-13
Payer: COMMERCIAL

## 2020-02-13 VITALS
BODY MASS INDEX: 20.37 KG/M2 | WEIGHT: 88 LBS | HEIGHT: 55 IN | RESPIRATION RATE: 13 BRPM | DIASTOLIC BLOOD PRESSURE: 86 MMHG | SYSTOLIC BLOOD PRESSURE: 179 MMHG | HEART RATE: 78 BPM

## 2020-02-13 DIAGNOSIS — R22.30 LOCALIZED SWELLING, MASS AND LUMP, UNSPECIFIED UPPER LIMB: ICD-10-CM

## 2020-02-13 PROCEDURE — 99214 OFFICE O/P EST MOD 30 MIN: CPT

## 2020-02-13 NOTE — REVIEW OF SYSTEMS
[Negative] : Integumentary [de-identified] : Diabetes [FreeTextEntry6] : asthma [FreeTextEntry5] : Hypertension [FreeTextEntry1] : breast cancer

## 2020-02-13 NOTE — HISTORY OF PRESENT ILLNESS
[de-identified] : 74 year-old nulliparous lady who we see for followup of LEFT BREAST CANCER\par \par She is adopted, and family history is not available\par \par \par 2000 she had left breast conserving surgery for stage I carcinoma by [facility], followed by radiation at our facility, and anti-estrogen therapy under the care of Dr Tamayo.\par \par She developed a left axillary recurrence in August 2014 which I resected, and was followed by radiation under the guidance of Dr. Shaila Basilio.\par \par She was seen by Dr. Kostas Marrero - On anastrozole.\par October 2019 followup visit  Janes was unremarkable\par \par November 2003: LCIS and ADH on a right breast biopsy\par \par \par Her internist is Dr. Cale PADILLA.\par \par No pacemaker or defibrillator\par Daily baby aspirin.\par \par Presently on anastrozole\par \par Asthma is managed by her PMD with Asmanex Ventolin, and Singulair.\par \par Amlodipine for hypertension.\par Hypercholesterolemia treated with Lipitor.\par Her cardiologist is Dr. Briana Mario\par \par DIABETES controlled with Janumet\par Her endocrinologist is Dr. Jennifer Castillo\par \par \par Her gynecologist is Dr. Nuzhat JEFFERSON, a visit in January 2020 was unremarkable.\par \par January 2017 she had a colonoscopy which was normal, by Dr. Rebel Xavier\par \par CC:Since October 2016 she has felt an asymptomatic, unchanged, "pea-sized" rubbery lump on the top of her right shoulder.\par I did not see or feel an abnormality on my examination.\par I offered to image the area with plain films and sonography, she is in agreement, but would like to wait until her breast imaging this summer; prescriptions entered.\par No other specific or constitutional signs or symptoms.

## 2020-02-13 NOTE — ASSESSMENT
[FreeTextEntry1] : October 2014 breast MRI and PET scan were unremarkable.\par \par July 2019:\par Annual, bilateral, mammogram and sonogram at 450: BI-RADS 2.\par Prescription entered for July 2020.\par \par Clinically doing well.\par \par If no problems we should see her in another year, sooner if needed

## 2020-02-13 NOTE — PHYSICAL EXAM
[Normal] : supple, no neck mass and thyroid not enlarged [Normal Neck Lymph Nodes] : normal neck lymph nodes  [Normal Supraclavicular Lymph Nodes] : normal supraclavicular lymph nodes [Normal Axillary Lymph Nodes] : normal axillary lymph nodes [Normal] : normal appearance, no rash, nodules, vesicles, ulcers, erythema [de-identified] : below [de-identified] : groins not examined

## 2020-03-17 ENCOUNTER — RX RENEWAL (OUTPATIENT)
Age: 75
End: 2020-03-17

## 2020-03-30 ENCOUNTER — APPOINTMENT (OUTPATIENT)
Dept: SURGICAL ONCOLOGY | Facility: CLINIC | Age: 75
End: 2020-03-30

## 2020-04-06 ENCOUNTER — RX RENEWAL (OUTPATIENT)
Age: 75
End: 2020-04-06

## 2020-04-15 ENCOUNTER — APPOINTMENT (OUTPATIENT)
Dept: FAMILY MEDICINE | Facility: CLINIC | Age: 75
End: 2020-04-15
Payer: COMMERCIAL

## 2020-04-15 DIAGNOSIS — J00 ACUTE NASOPHARYNGITIS [COMMON COLD]: ICD-10-CM

## 2020-04-15 DIAGNOSIS — R05 COUGH: ICD-10-CM

## 2020-04-15 PROCEDURE — 99213 OFFICE O/P EST LOW 20 MIN: CPT | Mod: 95

## 2020-04-15 NOTE — PHYSICAL EXAM
[No Acute Distress] : no acute distress [Well Nourished] : well nourished [Well Developed] : well developed [Well-Appearing] : well-appearing [No JVD] : no jugular venous distention [No Respiratory Distress] : no respiratory distress  [Normal Affect] : the affect was normal [Normal Insight/Judgement] : insight and judgment were intact

## 2020-04-15 NOTE — HISTORY OF PRESENT ILLNESS
[Home] : at home, [unfilled] , at the time of the visit. [Medical Office: ([facility])___] : at the medical office located in  [Patient] : the patient [Self] : self [FreeTextEntry8] : 74 year old female here with complaints of having a cough, usually associated with allergy season. Patients active medications, allergies and issues were all reviewed with the patient at time of visit.\par

## 2020-04-15 NOTE — ASSESSMENT
[FreeTextEntry1] : patient with seasonal allergies associated with cough\par does well with inhaler in the past\par will renew for patient\par if no improvement, rto

## 2020-05-26 ENCOUNTER — RX RENEWAL (OUTPATIENT)
Age: 75
End: 2020-05-26

## 2020-06-16 ENCOUNTER — RX RENEWAL (OUTPATIENT)
Age: 75
End: 2020-06-16

## 2020-07-13 ENCOUNTER — RX RENEWAL (OUTPATIENT)
Age: 75
End: 2020-07-13

## 2020-07-15 ENCOUNTER — OUTPATIENT (OUTPATIENT)
Dept: OUTPATIENT SERVICES | Facility: HOSPITAL | Age: 75
LOS: 1 days | Discharge: HOME | End: 2020-07-15

## 2020-07-15 DIAGNOSIS — Z98.89 OTHER SPECIFIED POSTPROCEDURAL STATES: Chronic | ICD-10-CM

## 2020-07-15 DIAGNOSIS — C50.919 MALIGNANT NEOPLASM OF UNSPECIFIED SITE OF UNSPECIFIED FEMALE BREAST: ICD-10-CM

## 2020-07-17 ENCOUNTER — APPOINTMENT (OUTPATIENT)
Dept: HEMATOLOGY ONCOLOGY | Facility: CLINIC | Age: 75
End: 2020-07-17
Payer: COMMERCIAL

## 2020-07-17 ENCOUNTER — RESULT REVIEW (OUTPATIENT)
Age: 75
End: 2020-07-17

## 2020-07-17 VITALS
SYSTOLIC BLOOD PRESSURE: 187 MMHG | WEIGHT: 85.54 LBS | HEART RATE: 64 BPM | RESPIRATION RATE: 16 BRPM | DIASTOLIC BLOOD PRESSURE: 99 MMHG | OXYGEN SATURATION: 98 % | BODY MASS INDEX: 21.41 KG/M2 | TEMPERATURE: 98.1 F

## 2020-07-17 LAB
25(OH)D3 SERPL-MCNC: 44.8 NG/ML
ALBUMIN SERPL ELPH-MCNC: 4.7 G/DL
ALP BLD-CCNC: 52 U/L
ALT SERPL-CCNC: 20 U/L
ANION GAP SERPL CALC-SCNC: 14 MMOL/L
AST SERPL-CCNC: 21 U/L
BASOPHILS # BLD AUTO: 0.05 K/UL — SIGNIFICANT CHANGE UP (ref 0–0.2)
BASOPHILS NFR BLD AUTO: 1 % — SIGNIFICANT CHANGE UP (ref 0–2)
BILIRUB SERPL-MCNC: 0.2 MG/DL
BUN SERPL-MCNC: 12 MG/DL
CALCIUM SERPL-MCNC: 9.9 MG/DL
CHLORIDE SERPL-SCNC: 100 MMOL/L
CO2 SERPL-SCNC: 26 MMOL/L
CREAT SERPL-MCNC: 0.62 MG/DL
EOSINOPHIL # BLD AUTO: 0.32 K/UL — SIGNIFICANT CHANGE UP (ref 0–0.5)
EOSINOPHIL NFR BLD AUTO: 6.3 % — HIGH (ref 0–6)
GLUCOSE SERPL-MCNC: 109 MG/DL
HCT VFR BLD CALC: 33.6 % — LOW (ref 34.5–45)
HGB BLD-MCNC: 11 G/DL — LOW (ref 11.5–15.5)
IMM GRANULOCYTES NFR BLD AUTO: 0.2 % — SIGNIFICANT CHANGE UP (ref 0–1.5)
LYMPHOCYTES # BLD AUTO: 1.08 K/UL — SIGNIFICANT CHANGE UP (ref 1–3.3)
LYMPHOCYTES # BLD AUTO: 21.1 % — SIGNIFICANT CHANGE UP (ref 13–44)
MCHC RBC-ENTMCNC: 30.6 PG — SIGNIFICANT CHANGE UP (ref 27–34)
MCHC RBC-ENTMCNC: 32.7 GM/DL — SIGNIFICANT CHANGE UP (ref 32–36)
MCV RBC AUTO: 93.3 FL — SIGNIFICANT CHANGE UP (ref 80–100)
MONOCYTES # BLD AUTO: 0.56 K/UL — SIGNIFICANT CHANGE UP (ref 0–0.9)
MONOCYTES NFR BLD AUTO: 11 % — SIGNIFICANT CHANGE UP (ref 2–14)
NEUTROPHILS # BLD AUTO: 3.09 K/UL — SIGNIFICANT CHANGE UP (ref 1.8–7.4)
NEUTROPHILS NFR BLD AUTO: 60.4 % — SIGNIFICANT CHANGE UP (ref 43–77)
NRBC # BLD: 0 /100 WBCS — SIGNIFICANT CHANGE UP (ref 0–0)
PLATELET # BLD AUTO: 264 K/UL — SIGNIFICANT CHANGE UP (ref 150–400)
POTASSIUM SERPL-SCNC: 5 MMOL/L
PROT SERPL-MCNC: 6.6 G/DL
RBC # BLD: 3.6 M/UL — LOW (ref 3.8–5.2)
RBC # FLD: 14.3 % — SIGNIFICANT CHANGE UP (ref 10.3–14.5)
SODIUM SERPL-SCNC: 139 MMOL/L
WBC # BLD: 5.11 K/UL — SIGNIFICANT CHANGE UP (ref 3.8–10.5)
WBC # FLD AUTO: 5.11 K/UL — SIGNIFICANT CHANGE UP (ref 3.8–10.5)

## 2020-07-17 PROCEDURE — 99213 OFFICE O/P EST LOW 20 MIN: CPT

## 2020-07-17 RX ORDER — AMLODIPINE BESYLATE 5 MG/1
5 TABLET ORAL
Refills: 0 | Status: DISCONTINUED | COMMUNITY
Start: 2019-05-03 | End: 2020-07-17

## 2020-07-17 NOTE — PHYSICAL EXAM
[Fully active, able to carry on all pre-disease performance without restriction] : Status 0 - Fully active, able to carry on all pre-disease performance without restriction [Normal] : grossly intact [de-identified] : no rash; skin nodule inside belly button; nontender, black color, smooth [de-identified] : no abnl masses palpable or axillary LN palpable; scar 6:00 left breast

## 2020-07-17 NOTE — HISTORY OF PRESENT ILLNESS
[Disease: _____________________] : Disease: [unfilled] [AJCC Stage: ____] : AJCC Stage: [unfilled] [de-identified] : She was initially evaluated in 10/2014 for left breast cancer.  She had a history of multifocal lobular carcinoma in situ and had left lumpectomy with axillary lymph node dissection in 4/25/2000.  She received radiation to the breast at [facility] and was treated with tamoxifen for 5 years with Dr Tamayo in [place].  On 7/2014, she had abnormal mammogram findings and she had FNA performed at the 1:00 axillary nodule which was positive for malignant cells.  On 8/29/2014, she had had left lymph node biopsy  that showed 1/2 lymph nodes positive for disease that was ER 90%, ME <5%, Lra2wui negative.  She had PET/ CT performed on 10/2014 and MRI of the breast which showed post surgical changes to the left axilla but could not identify disease focus.  We reviewed chemotherapy options for LN positive breast cancer which she refused.  We offered her endocrine therapy and referred to radiation evaluation.  She completed radiation with Dr Basilio and started on anastrozole since 1/2015.  [de-identified] : Since last visit, she saw Dr Rehman and will have breast imaging next week. She denies any new chest wall changes. She has been following covid precautions. Switched from amlodipine to atenolol. Blood pressure usually better at home; elevated during this visit. She denies any new supplements. Has noticed skin tag inside her belly button and not sure how long it has been there. No pain over area.  [de-identified] : Anastrozole 1/2015 to present  [de-identified] : ER >90%, NE <5%, Kka4fwv negative

## 2020-07-17 NOTE — ASSESSMENT
[FreeTextEntry1] : She is a 73 y/o F with recurrent left axillary LN breast cancer s/p resection and radiation. She has been on anastrozole since 1/2015 without any new signs or symptoms of breast cancer recurrence. She continues to tolerate medication well and willing to continue past 5 years. She had bone density last year and stable osteopenia. She will have breast imaging next week. She will see dermatology regarding umbilical skin lesion. Next follow up in 6 months but earlier if any new symptoms.\par

## 2020-07-23 ENCOUNTER — APPOINTMENT (OUTPATIENT)
Dept: RADIOLOGY | Facility: IMAGING CENTER | Age: 75
End: 2020-07-23
Payer: COMMERCIAL

## 2020-07-23 ENCOUNTER — RESULT REVIEW (OUTPATIENT)
Age: 75
End: 2020-07-23

## 2020-07-23 ENCOUNTER — APPOINTMENT (OUTPATIENT)
Dept: ULTRASOUND IMAGING | Facility: IMAGING CENTER | Age: 75
End: 2020-07-23
Payer: COMMERCIAL

## 2020-07-23 ENCOUNTER — APPOINTMENT (OUTPATIENT)
Dept: MAMMOGRAPHY | Facility: IMAGING CENTER | Age: 75
End: 2020-07-23
Payer: COMMERCIAL

## 2020-07-23 ENCOUNTER — OUTPATIENT (OUTPATIENT)
Dept: OUTPATIENT SERVICES | Facility: HOSPITAL | Age: 75
LOS: 1 days | End: 2020-07-23
Payer: COMMERCIAL

## 2020-07-23 DIAGNOSIS — R22.30 LOCALIZED SWELLING, MASS AND LUMP, UNSPECIFIED UPPER LIMB: ICD-10-CM

## 2020-07-23 DIAGNOSIS — Z00.8 ENCOUNTER FOR OTHER GENERAL EXAMINATION: ICD-10-CM

## 2020-07-23 DIAGNOSIS — Z98.89 OTHER SPECIFIED POSTPROCEDURAL STATES: Chronic | ICD-10-CM

## 2020-07-23 DIAGNOSIS — C50.919 MALIGNANT NEOPLASM OF UNSPECIFIED SITE OF UNSPECIFIED FEMALE BREAST: ICD-10-CM

## 2020-07-23 PROCEDURE — 73030 X-RAY EXAM OF SHOULDER: CPT | Mod: 26,RT

## 2020-07-23 PROCEDURE — 76641 ULTRASOUND BREAST COMPLETE: CPT | Mod: 26,50

## 2020-07-23 PROCEDURE — 77067 SCR MAMMO BI INCL CAD: CPT

## 2020-07-23 PROCEDURE — 77067 SCR MAMMO BI INCL CAD: CPT | Mod: 26

## 2020-07-23 PROCEDURE — 77063 BREAST TOMOSYNTHESIS BI: CPT | Mod: 26

## 2020-07-23 PROCEDURE — 76641 ULTRASOUND BREAST COMPLETE: CPT

## 2020-07-23 PROCEDURE — 76882 US LMTD JT/FCL EVL NVASC XTR: CPT

## 2020-07-23 PROCEDURE — 76882 US LMTD JT/FCL EVL NVASC XTR: CPT | Mod: 26,RT

## 2020-07-23 PROCEDURE — 77063 BREAST TOMOSYNTHESIS BI: CPT

## 2020-07-23 PROCEDURE — 73030 X-RAY EXAM OF SHOULDER: CPT

## 2020-07-23 PROCEDURE — 76882 US LMTD JT/FCL EVL NVASC XTR: CPT | Mod: 26,RT,59

## 2020-07-27 ENCOUNTER — TRANSCRIPTION ENCOUNTER (OUTPATIENT)
Age: 75
End: 2020-07-27

## 2020-09-10 ENCOUNTER — APPOINTMENT (OUTPATIENT)
Dept: FAMILY MEDICINE | Facility: CLINIC | Age: 75
End: 2020-09-10
Payer: COMMERCIAL

## 2020-09-10 VITALS — DIASTOLIC BLOOD PRESSURE: 76 MMHG | SYSTOLIC BLOOD PRESSURE: 120 MMHG

## 2020-09-10 DIAGNOSIS — M79.89 OTHER SPECIFIED SOFT TISSUE DISORDERS: ICD-10-CM

## 2020-09-10 PROCEDURE — 99213 OFFICE O/P EST LOW 20 MIN: CPT

## 2020-11-12 ENCOUNTER — APPOINTMENT (OUTPATIENT)
Dept: FAMILY MEDICINE | Facility: CLINIC | Age: 75
End: 2020-11-12
Payer: COMMERCIAL

## 2020-11-12 VITALS
HEIGHT: 55 IN | WEIGHT: 87.06 LBS | SYSTOLIC BLOOD PRESSURE: 120 MMHG | BODY MASS INDEX: 20.15 KG/M2 | DIASTOLIC BLOOD PRESSURE: 80 MMHG

## 2020-11-12 DIAGNOSIS — Z00.00 ENCOUNTER FOR GENERAL ADULT MEDICAL EXAMINATION W/OUT ABNORMAL FINDINGS: ICD-10-CM

## 2020-11-12 DIAGNOSIS — Z23 ENCOUNTER FOR IMMUNIZATION: ICD-10-CM

## 2020-11-12 DIAGNOSIS — M71.20 SYNOVIAL CYST OF POPLITEAL SPACE [BAKER], UNSPECIFIED KNEE: ICD-10-CM

## 2020-11-12 PROCEDURE — 99072 ADDL SUPL MATRL&STAF TM PHE: CPT

## 2020-11-12 PROCEDURE — 99214 OFFICE O/P EST MOD 30 MIN: CPT | Mod: 25

## 2020-11-12 PROCEDURE — 90686 IIV4 VACC NO PRSV 0.5 ML IM: CPT

## 2020-11-12 PROCEDURE — G0008: CPT

## 2020-12-23 PROBLEM — Z01.419 ENCOUNTER FOR GYNECOLOGICAL EXAMINATION WITH PAPANICOLAOU SMEAR OF CERVIX: Status: RESOLVED | Noted: 2020-01-09 | Resolved: 2020-12-23

## 2021-02-22 ENCOUNTER — APPOINTMENT (OUTPATIENT)
Dept: SURGICAL ONCOLOGY | Facility: CLINIC | Age: 76
End: 2021-02-22
Payer: COMMERCIAL

## 2021-02-22 VITALS
SYSTOLIC BLOOD PRESSURE: 196 MMHG | BODY MASS INDEX: 20.13 KG/M2 | WEIGHT: 87 LBS | HEIGHT: 55 IN | OXYGEN SATURATION: 98 % | HEART RATE: 63 BPM | DIASTOLIC BLOOD PRESSURE: 81 MMHG | RESPIRATION RATE: 15 BRPM

## 2021-02-22 PROCEDURE — 99214 OFFICE O/P EST MOD 30 MIN: CPT

## 2021-02-22 PROCEDURE — 99072 ADDL SUPL MATRL&STAF TM PHE: CPT

## 2021-02-22 NOTE — ASSESSMENT
[FreeTextEntry1] : October 2014 breast MRI and PET scan were unremarkable.\par \par July 2020:\par Annual, bilateral, mammogram and sonogram at 450: BI-RADS 2.\par Prescription entered for July 2021.\par \par Clinically doing well.\par \par If no problems we should see her in another year, sooner if needed

## 2021-02-22 NOTE — REVIEW OF SYSTEMS
[Negative] : Endocrine [FreeTextEntry5] : Hypertension [FreeTextEntry6] : Asthma [FreeTextEntry1] : Breast cancer

## 2021-02-22 NOTE — PHYSICAL EXAM
[Normal] : supple, no neck mass and thyroid not enlarged [Normal Neck Lymph Nodes] : normal neck lymph nodes  [Normal Supraclavicular Lymph Nodes] : normal supraclavicular lymph nodes [Normal Axillary Lymph Nodes] : normal axillary lymph nodes [Normal] : normal appearance, no rash, nodules, vesicles, ulcers, erythema [de-identified] : below [de-identified] : groins not examined

## 2021-02-22 NOTE — HISTORY OF PRESENT ILLNESS
[de-identified] : May be moving to [place], permanently, in the summer 2021\par \par \par 75 year-old nulliparous lady who we see for followup of LEFT BREAST CANCER\par \par She is adopted, and family history is not available\par \par \par 2000 she had left breast conserving surgery for stage I carcinoma by [facility], followed by radiation at our facility, and anti-estrogen therapy under the care of Dr Tamayo.\par \par She developed a left axillary recurrence in August 2014 which I resected.\par + Postoperative XRT: Dr. Shaila ROSAS.\par Medical oncology: Dr. Kostas MARRERO - On anastrozole.\par \par July 2020 followup visit with Dr. Marrero was unremarkable\par \par November 2003: LCIS and ADH on a right breast biopsy\par \par \par Her internist is Dr. Cale PADILLA.\par \par No pacemaker or defibrillator\par Daily baby aspirin.\par \par Presently on anastrozole\par \par Asthma is managed by her PMD with Asmanex Ventolin, and Singulair.\par \par Atenolol for hypertension.\par Hypercholesterolemia treated with Lipitor.\par Her cardiologist is Dr. Briana MARTIN\par \par DIABETES controlled with Janumet\par Her endocrinologist is Dr. Jennifer SAAVEDRA\par \par \par Her gynecologist is Dr. Nuzhat JEFFERSON, a visit in January 2020 was unremarkable.\par \par \par January 2017 she had a colonoscopy which was normal, by Dr. Rebel Xavier\par \par CC:Since October 2016 she has felt an asymptomatic, unchanged, "pea-sized" rubbery lump on the top of her right shoulder.\par I did not see or feel an abnormality on my examination.\par I offered to image the area with plain films and sonography, she is in agreement, but would like to wait until her breast imaging this summer; prescriptions entered.\par No other specific or constitutional signs or symptoms.

## 2021-04-21 ENCOUNTER — OUTPATIENT (OUTPATIENT)
Dept: OUTPATIENT SERVICES | Facility: HOSPITAL | Age: 76
LOS: 1 days | Discharge: HOME | End: 2021-04-21

## 2021-04-21 DIAGNOSIS — Z98.89 OTHER SPECIFIED POSTPROCEDURAL STATES: Chronic | ICD-10-CM

## 2021-04-21 DIAGNOSIS — C50.919 MALIGNANT NEOPLASM OF UNSPECIFIED SITE OF UNSPECIFIED FEMALE BREAST: ICD-10-CM

## 2021-04-23 ENCOUNTER — APPOINTMENT (OUTPATIENT)
Dept: HEMATOLOGY ONCOLOGY | Facility: CLINIC | Age: 76
End: 2021-04-23
Payer: COMMERCIAL

## 2021-04-23 VITALS
BODY MASS INDEX: 19.8 KG/M2 | HEART RATE: 71 BPM | WEIGHT: 85.54 LBS | HEIGHT: 55 IN | OXYGEN SATURATION: 97 % | RESPIRATION RATE: 16 BRPM | SYSTOLIC BLOOD PRESSURE: 178 MMHG | TEMPERATURE: 98.1 F | DIASTOLIC BLOOD PRESSURE: 75 MMHG

## 2021-04-23 PROCEDURE — 99072 ADDL SUPL MATRL&STAF TM PHE: CPT

## 2021-04-23 PROCEDURE — 99213 OFFICE O/P EST LOW 20 MIN: CPT

## 2021-04-23 NOTE — REVIEW OF SYSTEMS
[Joint Pain] : no joint pain [Joint Stiffness] : no joint stiffness [Muscle Pain] : muscle pain [Muscle Weakness] : no muscle weakness [Negative] : Allergic/Immunologic [FreeTextEntry9] : muscle achiness over the L chest wall

## 2021-04-23 NOTE — ASSESSMENT
[FreeTextEntry1] : She is a 74 y/o F with recurrent left axillary LN breast cancer s/p resection and radiation. She has been on anastrozole since 1/2015 without any new signs or symptoms of breast cancer recurrence. She continues to tolerate medication well and willing to continue past 5 years. She will have interval bone density this 9/2021. She will continue with exercise and Vitamin D to maintain bone health. She will have breast imaging in July. She will let us know who new doctor in [place] and her records can be transferred. Next follow up in 6 months but earlier if any new symptoms.\par

## 2021-04-23 NOTE — HISTORY OF PRESENT ILLNESS
[Disease: _____________________] : Disease: [unfilled] [AJCC Stage: ____] : AJCC Stage: [unfilled] [de-identified] : She was initially evaluated in 10/2014 for left breast cancer.  She had a history of multifocal lobular carcinoma in situ and had left lumpectomy with axillary lymph node dissection in 4/25/2000.  She received radiation to the breast at [facility] and was treated with tamoxifen for 5 years with Dr Tamayo in [place].  On 7/2014, she had abnormal mammogram findings and she had FNA performed at the 1:00 axillary nodule which was positive for malignant cells.  On 8/29/2014, she had had left lymph node biopsy  that showed 1/2 lymph nodes positive for disease that was ER 90%, MT <5%, Iiq5bcf negative.  She had PET/ CT performed on 10/2014 and MRI of the breast which showed post surgical changes to the left axilla but could not identify disease focus.  We reviewed chemotherapy options for LN positive breast cancer which she refused.  We offered her endocrine therapy and referred to radiation evaluation.  She completed radiation with Dr Basilio and started on anastrozole since 1/2015.  [de-identified] : ER >90%, AZ <5%, [id number] negative [de-identified] : Anastrozole 1/2015 to present  [de-identified] : She continues to tolerate anastrozole well. She had interval follow up and blood work from endocrinologist: Dr Castillo and will be repeating sodium numbers. She denies any new health changes. She had her mammogram done 7/2020. Has mild muscle tenderness over the L axilla but feels the tenderness is improved and feels aggravated by her sleeping on the left side. She denies any new breast changes or new back pain or cough. She will be planning to move to [place] and will eventually transfer her care over there. She does not recall when she had last bone density.

## 2021-04-23 NOTE — PHYSICAL EXAM
[Fully active, able to carry on all pre-disease performance without restriction] : Status 0 - Fully active, able to carry on all pre-disease performance without restriction [Normal] : affect appropriate [de-identified] : no abnl masses palpable or axillary LN palpable; scar 6:00 left breast

## 2021-07-27 ENCOUNTER — RESULT REVIEW (OUTPATIENT)
Age: 76
End: 2021-07-27

## 2021-07-27 ENCOUNTER — APPOINTMENT (OUTPATIENT)
Dept: ULTRASOUND IMAGING | Facility: IMAGING CENTER | Age: 76
End: 2021-07-27
Payer: COMMERCIAL

## 2021-07-27 ENCOUNTER — OUTPATIENT (OUTPATIENT)
Dept: OUTPATIENT SERVICES | Facility: HOSPITAL | Age: 76
LOS: 1 days | End: 2021-07-27
Payer: COMMERCIAL

## 2021-07-27 ENCOUNTER — APPOINTMENT (OUTPATIENT)
Dept: MAMMOGRAPHY | Facility: IMAGING CENTER | Age: 76
End: 2021-07-27
Payer: COMMERCIAL

## 2021-07-27 DIAGNOSIS — Z98.89 OTHER SPECIFIED POSTPROCEDURAL STATES: Chronic | ICD-10-CM

## 2021-07-27 DIAGNOSIS — Z00.8 ENCOUNTER FOR OTHER GENERAL EXAMINATION: ICD-10-CM

## 2021-07-27 PROCEDURE — 77067 SCR MAMMO BI INCL CAD: CPT

## 2021-07-27 PROCEDURE — 77063 BREAST TOMOSYNTHESIS BI: CPT

## 2021-07-27 PROCEDURE — 76641 ULTRASOUND BREAST COMPLETE: CPT

## 2021-07-27 PROCEDURE — 76641 ULTRASOUND BREAST COMPLETE: CPT | Mod: 26,50

## 2021-07-27 PROCEDURE — 77063 BREAST TOMOSYNTHESIS BI: CPT | Mod: 26

## 2021-07-27 PROCEDURE — 77067 SCR MAMMO BI INCL CAD: CPT | Mod: 26

## 2021-09-30 ENCOUNTER — APPOINTMENT (OUTPATIENT)
Dept: OPHTHALMOLOGY | Facility: CLINIC | Age: 76
End: 2021-09-30
Payer: MEDICARE

## 2021-09-30 ENCOUNTER — NON-APPOINTMENT (OUTPATIENT)
Age: 76
End: 2021-09-30

## 2021-09-30 PROCEDURE — 92014 COMPRE OPH EXAM EST PT 1/>: CPT

## 2021-10-28 ENCOUNTER — APPOINTMENT (OUTPATIENT)
Dept: SURGICAL ONCOLOGY | Facility: CLINIC | Age: 76
End: 2021-10-28
Payer: MEDICARE

## 2021-10-28 VITALS
HEART RATE: 70 BPM | TEMPERATURE: 97.6 F | WEIGHT: 84 LBS | DIASTOLIC BLOOD PRESSURE: 81 MMHG | HEIGHT: 55 IN | SYSTOLIC BLOOD PRESSURE: 160 MMHG | RESPIRATION RATE: 16 BRPM | BODY MASS INDEX: 19.44 KG/M2 | OXYGEN SATURATION: 96 %

## 2021-10-28 PROCEDURE — 99215 OFFICE O/P EST HI 40 MIN: CPT

## 2021-10-28 NOTE — PHYSICAL EXAM
[Normal] : supple, no neck mass and thyroid not enlarged [Normal Neck Lymph Nodes] : normal neck lymph nodes  [Normal Supraclavicular Lymph Nodes] : normal supraclavicular lymph nodes [Normal Axillary Lymph Nodes] : normal axillary lymph nodes [Normal] : normal appearance, no rash, nodules, vesicles, ulcers, erythema [de-identified] : groins not examined [de-identified] : below

## 2021-10-28 NOTE — REVIEW OF SYSTEMS
[Negative] : Integumentary [FreeTextEntry5] : Hypertension [FreeTextEntry6] : Asthma [de-identified] : Diabetes [FreeTextEntry1] : Breast cancer

## 2021-10-28 NOTE — REASON FOR VISIT
[Follow-Up Visit] : a follow-up visit for [Other: _____] : [unfilled] [FreeTextEntry2] : Left breast cancer, with subsequent metachronous regional recurrence

## 2021-10-28 NOTE — ASSESSMENT
[FreeTextEntry1] : October 2014 breast MRI and PET scan were unremarkable.\par \par Today she is asymptomatic with a normal breast examination.  In upper left breast.\par Because of her history of left breast cancer, with a subsequent metachronous regional recurrence, I have suggested a diagnostic left mammogram and ultrasound now.\par She understands and agrees.\par Prescription entered.\par \par She will call me to discuss the results a week after the diagnostic left mammogram and ultrasound.\par If that imaging is normal, and concern persists regarding an abnormality in the left breast, it will likely be prudent to repeat her breast MRI..........................\par \par \par \par July 2021:\par Annual, bilateral, mammogram and sonogram at 450: BI-RADS 2.\par Prescription entered for July 2022.\par \par \par Because of the concern of a possible change in self-examination 2 weeks prior to this visit, even if her imaging is normal I have asked to see her for short-term follow-up in 4 to 6 months, sooner if needed.\par \par Reviewed in detail, all questions answered

## 2021-11-09 ENCOUNTER — APPOINTMENT (OUTPATIENT)
Dept: MAMMOGRAPHY | Facility: IMAGING CENTER | Age: 76
End: 2021-11-09
Payer: MEDICARE

## 2021-11-09 ENCOUNTER — RESULT REVIEW (OUTPATIENT)
Age: 76
End: 2021-11-09

## 2021-11-09 ENCOUNTER — APPOINTMENT (OUTPATIENT)
Dept: ULTRASOUND IMAGING | Facility: IMAGING CENTER | Age: 76
End: 2021-11-09
Payer: MEDICARE

## 2021-11-09 ENCOUNTER — OUTPATIENT (OUTPATIENT)
Dept: OUTPATIENT SERVICES | Facility: HOSPITAL | Age: 76
LOS: 1 days | End: 2021-11-09
Payer: MEDICARE

## 2021-11-09 DIAGNOSIS — Z98.89 OTHER SPECIFIED POSTPROCEDURAL STATES: Chronic | ICD-10-CM

## 2021-11-09 DIAGNOSIS — C50.919 MALIGNANT NEOPLASM OF UNSPECIFIED SITE OF UNSPECIFIED FEMALE BREAST: ICD-10-CM

## 2021-11-09 PROCEDURE — 77065 DX MAMMO INCL CAD UNI: CPT | Mod: 26,LT

## 2021-11-09 PROCEDURE — G0279: CPT

## 2021-11-09 PROCEDURE — 76642 ULTRASOUND BREAST LIMITED: CPT | Mod: 26,LT

## 2021-11-09 PROCEDURE — 77065 DX MAMMO INCL CAD UNI: CPT

## 2021-11-09 PROCEDURE — G0279: CPT | Mod: 26

## 2021-11-09 PROCEDURE — 76641 ULTRASOUND BREAST COMPLETE: CPT

## 2021-11-09 PROCEDURE — 76642 ULTRASOUND BREAST LIMITED: CPT

## 2021-12-07 ENCOUNTER — APPOINTMENT (OUTPATIENT)
Dept: FAMILY MEDICINE | Facility: CLINIC | Age: 76
End: 2021-12-07

## 2021-12-31 ENCOUNTER — RX RENEWAL (OUTPATIENT)
Age: 76
End: 2021-12-31

## 2022-01-25 ENCOUNTER — RX RENEWAL (OUTPATIENT)
Age: 77
End: 2022-01-25

## 2022-01-25 RX ORDER — ALBUTEROL SULFATE 90 UG/1
108 (90 BASE) INHALANT RESPIRATORY (INHALATION)
Qty: 8.5 | Refills: 0 | Status: ACTIVE | COMMUNITY
Start: 2020-05-26 | End: 1900-01-01

## 2022-03-31 ENCOUNTER — NON-APPOINTMENT (OUTPATIENT)
Age: 77
End: 2022-03-31

## 2022-04-10 ENCOUNTER — OUTPATIENT (OUTPATIENT)
Dept: OUTPATIENT SERVICES | Facility: HOSPITAL | Age: 77
LOS: 1 days | Discharge: HOME | End: 2022-04-10

## 2022-04-10 DIAGNOSIS — C50.919 MALIGNANT NEOPLASM OF UNSPECIFIED SITE OF UNSPECIFIED FEMALE BREAST: ICD-10-CM

## 2022-04-10 DIAGNOSIS — Z98.89 OTHER SPECIFIED POSTPROCEDURAL STATES: Chronic | ICD-10-CM

## 2022-04-14 ENCOUNTER — APPOINTMENT (OUTPATIENT)
Dept: HEMATOLOGY ONCOLOGY | Facility: CLINIC | Age: 77
End: 2022-04-14
Payer: MEDICARE

## 2022-04-14 VITALS
RESPIRATION RATE: 16 BRPM | BODY MASS INDEX: 19.64 KG/M2 | SYSTOLIC BLOOD PRESSURE: 179 MMHG | WEIGHT: 84.88 LBS | HEART RATE: 79 BPM | TEMPERATURE: 99 F | HEIGHT: 55 IN | DIASTOLIC BLOOD PRESSURE: 84 MMHG

## 2022-04-14 PROCEDURE — 99214 OFFICE O/P EST MOD 30 MIN: CPT

## 2022-04-14 NOTE — PHYSICAL EXAM
[Fully active, able to carry on all pre-disease performance without restriction] : Status 0 - Fully active, able to carry on all pre-disease performance without restriction [Normal] : affect appropriate [de-identified] : no abnl masses palpable or axillary LN palpable; scar 6:00 left breast

## 2022-04-14 NOTE — ASSESSMENT
[FreeTextEntry1] : She is a 75 y/o F with recurrent left axillary LN breast cancer s/p resection and radiation. She has been on anastrozole since 1/2015 without any new signs or symptoms of breast cancer recurrence. She will have interval bone density. We reviewed calcium and Vitamin D supplementation along with exercise to maintain bone health. We will obtain copy of blood work from her endocrinologist. She understands if any new symptoms she will call. Next follow up in 1 year but earlier if any new symptoms.\par

## 2022-04-14 NOTE — REVIEW OF SYSTEMS
[Joint Pain] : no joint pain [Joint Stiffness] : no joint stiffness [Muscle Pain] : muscle pain [Muscle Weakness] : no muscle weakness [Negative] : Allergic/Immunologic [FreeTextEntry9] : leg cramping

## 2022-04-14 NOTE — HISTORY OF PRESENT ILLNESS
[Disease: _____________________] : Disease: [unfilled] [AJCC Stage: ____] : AJCC Stage: [unfilled] [de-identified] : She was initially evaluated in 10/2014 for left breast cancer.  She had a history of multifocal lobular carcinoma in situ and had left lumpectomy with axillary lymph node dissection in 4/25/2000.  She received radiation to the breast at [facility] and was treated with tamoxifen for 5 years with Dr Tamayo in [place].  On 7/2014, she had abnormal mammogram findings and she had FNA performed at the 1:00 axillary nodule which was positive for malignant cells.  On 8/29/2014, she had had left lymph node biopsy  that showed 1/2 lymph nodes positive for disease that was ER 90%, AR <5%, Eod5sij negative.  She had PET/ CT performed on 10/2014 and MRI of the breast which showed post surgical changes to the left axilla but could not identify disease focus.  We reviewed chemotherapy options for LN positive breast cancer which she refused.  We offered her endocrine therapy and referred to radiation evaluation.  She completed radiation with Dr Basilio and started on anastrozole since 1/2015.  [de-identified] : ER >90%, ND <5%, Smp5fci negative [de-identified] : Anastrozole 1/2015 to present  [de-identified] : Since last year, she had new medications: temisartan for BP and adjustment of atenolol. She has leg cramping but takes magnesium with relief. Denies any new breast or chest wall pain. She will be getting bone density testing next month. Denies any new supplements. Has been taking calcium.

## 2022-05-13 ENCOUNTER — APPOINTMENT (OUTPATIENT)
Dept: OBGYN | Facility: CLINIC | Age: 77
End: 2022-05-13
Payer: MEDICARE

## 2022-05-13 VITALS
BODY MASS INDEX: 19.44 KG/M2 | WEIGHT: 84 LBS | DIASTOLIC BLOOD PRESSURE: 80 MMHG | HEIGHT: 55 IN | SYSTOLIC BLOOD PRESSURE: 130 MMHG

## 2022-05-13 PROCEDURE — G0101: CPT

## 2022-05-13 NOTE — HISTORY OF PRESENT ILLNESS
[FreeTextEntry1] : 77 y/o postmenopausal woman Hx of breast CA \par Patient without complaints\par COvid vaccinated total 2 doses +1 booster/Pfizer \par Hasn't gotten COVID \par no VB \par UTD with colonoscopy \par

## 2022-05-13 NOTE — DISCUSSION/SUMMARY
[FreeTextEntry1] : 75 y/o postmenopausal woman Hx of breast CA \par Pap\par Followed by Dr. Rehman for hx of breast CA\par UTD with colonoscopy\par BDS\par F/u 2 years/PRN

## 2022-05-23 ENCOUNTER — APPOINTMENT (OUTPATIENT)
Dept: SURGICAL ONCOLOGY | Facility: CLINIC | Age: 77
End: 2022-05-23
Payer: MEDICARE

## 2022-05-23 VITALS
BODY MASS INDEX: 19.44 KG/M2 | WEIGHT: 84 LBS | HEIGHT: 55 IN | RESPIRATION RATE: 17 BRPM | TEMPERATURE: 97.8 F | SYSTOLIC BLOOD PRESSURE: 194 MMHG | HEART RATE: 50 BPM | DIASTOLIC BLOOD PRESSURE: 80 MMHG | OXYGEN SATURATION: 98 %

## 2022-05-23 LAB — CYTOLOGY CVX/VAG DOC THIN PREP: ABNORMAL

## 2022-05-23 PROCEDURE — 99214 OFFICE O/P EST MOD 30 MIN: CPT

## 2022-08-04 ENCOUNTER — APPOINTMENT (OUTPATIENT)
Dept: FAMILY MEDICINE | Facility: CLINIC | Age: 77
End: 2022-08-04

## 2022-08-04 VITALS
BODY MASS INDEX: 18.97 KG/M2 | SYSTOLIC BLOOD PRESSURE: 168 MMHG | TEMPERATURE: 97.6 F | OXYGEN SATURATION: 96 % | HEIGHT: 55 IN | HEART RATE: 128 BPM | DIASTOLIC BLOOD PRESSURE: 100 MMHG | WEIGHT: 82 LBS

## 2022-08-04 VITALS — SYSTOLIC BLOOD PRESSURE: 130 MMHG | DIASTOLIC BLOOD PRESSURE: 84 MMHG

## 2022-08-04 DIAGNOSIS — I47.1 SUPRAVENTRICULAR TACHYCARDIA: ICD-10-CM

## 2022-08-04 DIAGNOSIS — E78.00 PURE HYPERCHOLESTEROLEMIA, UNSPECIFIED: ICD-10-CM

## 2022-08-04 PROCEDURE — 99214 OFFICE O/P EST MOD 30 MIN: CPT

## 2022-08-20 ENCOUNTER — NON-APPOINTMENT (OUTPATIENT)
Age: 77
End: 2022-08-20

## 2022-08-24 ENCOUNTER — APPOINTMENT (OUTPATIENT)
Dept: ULTRASOUND IMAGING | Facility: IMAGING CENTER | Age: 77
End: 2022-08-24

## 2022-08-24 ENCOUNTER — APPOINTMENT (OUTPATIENT)
Dept: MAMMOGRAPHY | Facility: IMAGING CENTER | Age: 77
End: 2022-08-24

## 2022-08-24 ENCOUNTER — RESULT REVIEW (OUTPATIENT)
Age: 77
End: 2022-08-24

## 2022-08-24 ENCOUNTER — OUTPATIENT (OUTPATIENT)
Dept: OUTPATIENT SERVICES | Facility: HOSPITAL | Age: 77
LOS: 1 days | End: 2022-08-24
Payer: MEDICARE

## 2022-08-24 DIAGNOSIS — Z00.8 ENCOUNTER FOR OTHER GENERAL EXAMINATION: ICD-10-CM

## 2022-08-24 DIAGNOSIS — Z98.89 OTHER SPECIFIED POSTPROCEDURAL STATES: Chronic | ICD-10-CM

## 2022-08-24 PROCEDURE — 77067 SCR MAMMO BI INCL CAD: CPT

## 2022-08-24 PROCEDURE — 77067 SCR MAMMO BI INCL CAD: CPT | Mod: 26

## 2022-08-24 PROCEDURE — 76641 ULTRASOUND BREAST COMPLETE: CPT | Mod: 26,50

## 2022-08-24 PROCEDURE — 77063 BREAST TOMOSYNTHESIS BI: CPT

## 2022-08-24 PROCEDURE — 76641 ULTRASOUND BREAST COMPLETE: CPT

## 2022-08-24 PROCEDURE — 77063 BREAST TOMOSYNTHESIS BI: CPT | Mod: 26

## 2022-10-05 ENCOUNTER — OUTPATIENT (OUTPATIENT)
Dept: OUTPATIENT SERVICES | Facility: HOSPITAL | Age: 77
LOS: 1 days | End: 2022-10-05

## 2022-10-05 VITALS
RESPIRATION RATE: 16 BRPM | HEIGHT: 55 IN | SYSTOLIC BLOOD PRESSURE: 160 MMHG | TEMPERATURE: 98 F | OXYGEN SATURATION: 97 % | WEIGHT: 82.01 LBS | DIASTOLIC BLOOD PRESSURE: 80 MMHG | HEART RATE: 51 BPM

## 2022-10-05 DIAGNOSIS — C50.919 MALIGNANT NEOPLASM OF UNSPECIFIED SITE OF UNSPECIFIED FEMALE BREAST: ICD-10-CM

## 2022-10-05 DIAGNOSIS — Z98.89 OTHER SPECIFIED POSTPROCEDURAL STATES: Chronic | ICD-10-CM

## 2022-10-05 DIAGNOSIS — I10 ESSENTIAL (PRIMARY) HYPERTENSION: ICD-10-CM

## 2022-10-05 DIAGNOSIS — E11.9 TYPE 2 DIABETES MELLITUS WITHOUT COMPLICATIONS: ICD-10-CM

## 2022-10-05 DIAGNOSIS — J45.909 UNSPECIFIED ASTHMA, UNCOMPLICATED: ICD-10-CM

## 2022-10-05 DIAGNOSIS — C50.912 MALIGNANT NEOPLASM OF UNSPECIFIED SITE OF LEFT FEMALE BREAST: ICD-10-CM

## 2022-10-05 LAB
A1C WITH ESTIMATED AVERAGE GLUCOSE RESULT: 6 % — HIGH (ref 4–5.6)
ALBUMIN SERPL ELPH-MCNC: 4.3 G/DL — SIGNIFICANT CHANGE UP (ref 3.3–5)
ALP SERPL-CCNC: 65 U/L — SIGNIFICANT CHANGE UP (ref 40–120)
ALT FLD-CCNC: 23 U/L — SIGNIFICANT CHANGE UP (ref 4–33)
ANION GAP SERPL CALC-SCNC: 10 MMOL/L — SIGNIFICANT CHANGE UP (ref 7–14)
AST SERPL-CCNC: 19 U/L — SIGNIFICANT CHANGE UP (ref 4–32)
BILIRUB SERPL-MCNC: <0.2 MG/DL — SIGNIFICANT CHANGE UP (ref 0.2–1.2)
BUN SERPL-MCNC: 22 MG/DL — SIGNIFICANT CHANGE UP (ref 7–23)
CALCIUM SERPL-MCNC: 9.4 MG/DL — SIGNIFICANT CHANGE UP (ref 8.4–10.5)
CHLORIDE SERPL-SCNC: 99 MMOL/L — SIGNIFICANT CHANGE UP (ref 98–107)
CO2 SERPL-SCNC: 25 MMOL/L — SIGNIFICANT CHANGE UP (ref 22–31)
CREAT SERPL-MCNC: 0.54 MG/DL — SIGNIFICANT CHANGE UP (ref 0.5–1.3)
EGFR: 97 ML/MIN/1.73M2 — SIGNIFICANT CHANGE UP
ESTIMATED AVERAGE GLUCOSE: 126 — SIGNIFICANT CHANGE UP
GLUCOSE SERPL-MCNC: 102 MG/DL — HIGH (ref 70–99)
HCT VFR BLD CALC: 30 % — LOW (ref 34.5–45)
HGB BLD-MCNC: 9.5 G/DL — LOW (ref 11.5–15.5)
MCHC RBC-ENTMCNC: 29.9 PG — SIGNIFICANT CHANGE UP (ref 27–34)
MCHC RBC-ENTMCNC: 31.7 GM/DL — LOW (ref 32–36)
MCV RBC AUTO: 94.3 FL — SIGNIFICANT CHANGE UP (ref 80–100)
NRBC # BLD: 0 /100 WBCS — SIGNIFICANT CHANGE UP (ref 0–0)
NRBC # FLD: 0 K/UL — SIGNIFICANT CHANGE UP (ref 0–0)
PLATELET # BLD AUTO: 303 K/UL — SIGNIFICANT CHANGE UP (ref 150–400)
POTASSIUM SERPL-MCNC: 4.4 MMOL/L — SIGNIFICANT CHANGE UP (ref 3.5–5.3)
POTASSIUM SERPL-SCNC: 4.4 MMOL/L — SIGNIFICANT CHANGE UP (ref 3.5–5.3)
PROT SERPL-MCNC: 6.8 G/DL — SIGNIFICANT CHANGE UP (ref 6–8.3)
RBC # BLD: 3.18 M/UL — LOW (ref 3.8–5.2)
RBC # FLD: 14.8 % — HIGH (ref 10.3–14.5)
SODIUM SERPL-SCNC: 134 MMOL/L — LOW (ref 135–145)
WBC # BLD: 7.07 K/UL — SIGNIFICANT CHANGE UP (ref 3.8–10.5)
WBC # FLD AUTO: 7.07 K/UL — SIGNIFICANT CHANGE UP (ref 3.8–10.5)

## 2022-10-05 PROCEDURE — 93010 ELECTROCARDIOGRAM REPORT: CPT

## 2022-10-05 RX ORDER — TELMISARTAN 20 MG/1
1 TABLET ORAL
Qty: 0 | Refills: 0 | DISCHARGE

## 2022-10-05 RX ORDER — ATENOLOL 25 MG/1
1 TABLET ORAL
Qty: 0 | Refills: 0 | DISCHARGE

## 2022-10-05 NOTE — H&P PST ADULT - MUSCULOSKELETAL
details… ROM intact/no joint swelling/normal gait/strength 5/5 bilateral upper extremities/strength 5/5 bilateral lower extremities/back exam

## 2022-10-05 NOTE — H&P PST ADULT - ATTENDING COMMENTS
76-year-old lady with a history of left breast cancer treated with breast conservation therapy.  She is asymptomatic with a normal self exam and physical exam.  On recent mammography and sonography skin thickening is noted in the conserved left breast at 8:00.  Today she is scheduled for several systematic punch biopsies of the lower inner quadrant of her left breast.    Discussed with her prior to surgery, and again today.    All questions answered, consent on chart 76-year-old lady with a history of left breast cancer treated with breast conservation therapy.  She is asymptomatic with a normal self exam and physical exam.  On recent mammography and sonography skin thickening is noted in the conserved left breast at 8:00.  Today she is scheduled for several systematic punch biopsies of the lower inner quadrant of her left breast.    Discussed with her prior to surgery, and again today.    All questions answered, consent on chart.    Above procedure not done due to elevated BP in the pre-op holding area.  Will reschedule when BP is better controlled.

## 2022-10-05 NOTE — H&P PST ADULT - PROBLEM SELECTOR PLAN 1
preop for multiple biopsies of left breast skin on 10/13/22  preop instructions given, pt verbalized understanding  GI prophylaxis and chlorhexidine wash provided  pt is scheduled for COVID testing preop  medical evaluation requested- pt with uncontrolled BP  comparison EKG and recent ECHO requested from cardiologist preop for multiple biopsies of left breast skin on 10/13/22  preop instructions given, pt verbalized understanding  GI prophylaxis and chlorhexidine wash provided  pt is scheduled for COVID testing preop  medical evaluation requested- pt with elevated BP reading at PST  comparison EKG and recent ECHO requested from cardiologist

## 2022-10-05 NOTE — H&P PST ADULT - HISTORY OF PRESENT ILLNESS
77 y/o female with h/o left breast CA s/p lumpectomy with RT in 2000 & left axillary lymph node dissection in 2014 for recurrence presents to PST preop for multiple biopsies of left breast skin. Pt states routine breast US revealed area of concern that needed to be further investigated.  75 y/o female with h/o left breast CA s/p lumpectomy with RT in 2000 & left axillary lymph node dissection in 2014 for recurrence presents to PST preop for multiple biopsies of left breast skin. Pt states routine breast US revealed area of concern that needs to be further investigated.

## 2022-10-05 NOTE — H&P PST ADULT - NSICDXPASTMEDICALHX_GEN_ALL_CORE_FT
PAST MEDICAL HISTORY:  Asthma controlled    Axillary adenopathy left axillary    Breast CA, left s/p RT/Tamoxifen in 2000    Diabetes type 2, controlled     HLD (hyperlipidemia)     HTN (hypertension)     MVP (mitral valve prolapse) mild, asymptomatic    Palpitations caffeine related

## 2022-10-05 NOTE — H&P PST ADULT - OTHER CARE PROVIDERS
Oncologist Dr. Kostas Marrero   Cardiologist Dr. Briana Mario   Endocrinologist Dr. Martha Castillo Oncologist Dr. Kostas Marrero   Cardiologist Dr. Briana Mario [phone]  Endocrinologist Dr. Martha Castillo

## 2022-10-05 NOTE — H&P PST ADULT - NSICDXPASTSURGICALHX_GEN_ALL_CORE_FT
PAST SURGICAL HISTORY:  S/P biopsy of left axillary node in 2014 for left axillary recurrence  with RT    S/P lumpectomy, left breast 2000 with RT

## 2022-10-07 ENCOUNTER — NON-APPOINTMENT (OUTPATIENT)
Age: 77
End: 2022-10-07

## 2022-10-07 ENCOUNTER — APPOINTMENT (OUTPATIENT)
Dept: OPHTHALMOLOGY | Facility: CLINIC | Age: 77
End: 2022-10-07

## 2022-10-07 PROCEDURE — 92014 COMPRE OPH EXAM EST PT 1/>: CPT

## 2022-10-10 ENCOUNTER — APPOINTMENT (OUTPATIENT)
Dept: FAMILY MEDICINE | Facility: CLINIC | Age: 77
End: 2022-10-10

## 2022-10-10 VITALS
HEART RATE: 57 BPM | BODY MASS INDEX: 18.97 KG/M2 | HEIGHT: 55 IN | OXYGEN SATURATION: 96 % | DIASTOLIC BLOOD PRESSURE: 77 MMHG | SYSTOLIC BLOOD PRESSURE: 126 MMHG | WEIGHT: 82 LBS

## 2022-10-10 DIAGNOSIS — J45.909 UNSPECIFIED ASTHMA, UNCOMPLICATED: ICD-10-CM

## 2022-10-10 DIAGNOSIS — Z01.818 ENCOUNTER FOR OTHER PREPROCEDURAL EXAMINATION: ICD-10-CM

## 2022-10-10 DIAGNOSIS — E11.9 TYPE 2 DIABETES MELLITUS W/OUT COMPLICATIONS: ICD-10-CM

## 2022-10-10 DIAGNOSIS — R92.8 OTHER ABNORMAL AND INCONCLUSIVE FINDINGS ON DIAGNOSTIC IMAGING OF BREAST: ICD-10-CM

## 2022-10-10 DIAGNOSIS — I10 ESSENTIAL (PRIMARY) HYPERTENSION: ICD-10-CM

## 2022-10-10 PROCEDURE — 99214 OFFICE O/P EST MOD 30 MIN: CPT

## 2022-10-10 RX ORDER — TELMISARTAN 20 MG/1
20 TABLET ORAL
Qty: 90 | Refills: 0 | Status: ACTIVE | COMMUNITY
Start: 2022-05-16

## 2022-10-10 RX ORDER — MONTELUKAST 10 MG/1
10 TABLET, FILM COATED ORAL
Qty: 1 | Refills: 2 | Status: ACTIVE | COMMUNITY
Start: 2022-10-10

## 2022-10-10 RX ORDER — TURMERIC ROOT EXTRACT 500 MG
TABLET ORAL
Refills: 0 | Status: ACTIVE | COMMUNITY

## 2022-10-10 RX ORDER — MULTIVITAMIN
TABLET ORAL
Refills: 0 | Status: ACTIVE | COMMUNITY

## 2022-10-10 RX ORDER — ASCORBIC ACID 500 MG
TABLET ORAL
Refills: 0 | Status: ACTIVE | COMMUNITY

## 2022-10-10 RX ORDER — OMEGA-3/DHA/EPA/FISH OIL 300-1000MG
CAPSULE ORAL
Refills: 0 | Status: ACTIVE | COMMUNITY

## 2022-10-10 RX ORDER — VITAMIN E ACID SUCCINATE 268 MG
TABLET ORAL
Refills: 0 | Status: ACTIVE | COMMUNITY

## 2022-10-10 RX ORDER — ATENOLOL 100 MG/1
100 TABLET ORAL TWICE DAILY
Refills: 0 | Status: ACTIVE | COMMUNITY
Start: 2020-07-17

## 2022-10-10 NOTE — HISTORY OF PRESENT ILLNESS
[No Pertinent Cardiac History] : no history of aortic stenosis, atrial fibrillation, coronary artery disease, recent myocardial infarction, or implantable device/pacemaker [Asthma] : asthma [No Adverse Anesthesia Reaction] : no adverse anesthesia reaction in self or family member [Diabetes] : diabetes [(Patient denies any chest pain, claudication, dyspnea on exertion, orthopnea, palpitations or syncope)] : Patient denies any chest pain, claudication, dyspnea on exertion, orthopnea, palpitations or syncope [COPD] : no COPD [Sleep Apnea] : no sleep apnea [Smoker] : not a smoker [Chronic Anticoagulation] : no chronic anticoagulation [Chronic Kidney Disease] : no chronic kidney disease [FreeTextEntry1] : left breast biopsy [FreeTextEntry2] : 10/13/2022 [FreeTextEntry3] : Dr. Rehman [FreeTextEntry4] : here for preop clearance\par takes albuterol as needed [FreeTextEntry7] : Dr. Bonilla, cardio

## 2022-10-10 NOTE — PHYSICAL EXAM
[No Acute Distress] : no acute distress [Normal Sclera/Conjunctiva] : normal sclera/conjunctiva [EOMI] : extraocular movements intact [Normal Outer Ear/Nose] : the outer ears and nose were normal in appearance [No JVD] : no jugular venous distention [No Lymphadenopathy] : no lymphadenopathy [Supple] : supple [Normal] : normal rate, regular rhythm, normal S1 and S2 and no murmur heard [No Edema] : there was no peripheral edema [Coordination Grossly Intact] : coordination grossly intact [Normal Affect] : the affect was normal [Alert and Oriented x3] : oriented to person, place, and time [Normal Insight/Judgement] : insight and judgment were intact

## 2022-10-12 ENCOUNTER — TRANSCRIPTION ENCOUNTER (OUTPATIENT)
Age: 77
End: 2022-10-12

## 2022-10-13 ENCOUNTER — APPOINTMENT (OUTPATIENT)
Dept: SURGICAL ONCOLOGY | Facility: AMBULATORY SURGERY CENTER | Age: 77
End: 2022-10-13

## 2022-11-10 PROBLEM — I10 ESSENTIAL (PRIMARY) HYPERTENSION: Chronic | Status: ACTIVE | Noted: 2022-10-05

## 2022-12-12 ENCOUNTER — NON-APPOINTMENT (OUTPATIENT)
Age: 77
End: 2022-12-12

## 2022-12-12 ENCOUNTER — APPOINTMENT (OUTPATIENT)
Dept: OPHTHALMOLOGY | Facility: CLINIC | Age: 77
End: 2022-12-12

## 2022-12-12 PROCEDURE — 92071 CONTACT LENS FITTING FOR TX: CPT | Mod: RT

## 2022-12-12 PROCEDURE — 92012 INTRM OPH EXAM EST PATIENT: CPT

## 2022-12-21 ENCOUNTER — APPOINTMENT (OUTPATIENT)
Dept: OPHTHALMOLOGY | Facility: CLINIC | Age: 77
End: 2022-12-21

## 2022-12-21 ENCOUNTER — NON-APPOINTMENT (OUTPATIENT)
Age: 77
End: 2022-12-21

## 2022-12-21 PROCEDURE — 92012 INTRM OPH EXAM EST PATIENT: CPT

## 2023-01-09 ENCOUNTER — OUTPATIENT (OUTPATIENT)
Dept: OUTPATIENT SERVICES | Facility: HOSPITAL | Age: 78
LOS: 1 days | End: 2023-01-09

## 2023-01-09 VITALS
SYSTOLIC BLOOD PRESSURE: 130 MMHG | TEMPERATURE: 97 F | HEIGHT: 55 IN | RESPIRATION RATE: 16 BRPM | HEART RATE: 60 BPM | WEIGHT: 80.91 LBS | DIASTOLIC BLOOD PRESSURE: 80 MMHG | OXYGEN SATURATION: 99 %

## 2023-01-09 DIAGNOSIS — C50.919 MALIGNANT NEOPLASM OF UNSPECIFIED SITE OF UNSPECIFIED FEMALE BREAST: ICD-10-CM

## 2023-01-09 DIAGNOSIS — Z98.89 OTHER SPECIFIED POSTPROCEDURAL STATES: Chronic | ICD-10-CM

## 2023-01-09 DIAGNOSIS — E11.9 TYPE 2 DIABETES MELLITUS WITHOUT COMPLICATIONS: ICD-10-CM

## 2023-01-09 DIAGNOSIS — I10 ESSENTIAL (PRIMARY) HYPERTENSION: ICD-10-CM

## 2023-01-09 DIAGNOSIS — Z98.890 OTHER SPECIFIED POSTPROCEDURAL STATES: Chronic | ICD-10-CM

## 2023-01-09 LAB
A1C WITH ESTIMATED AVERAGE GLUCOSE RESULT: 5.8 % — HIGH (ref 4–5.6)
ALBUMIN SERPL ELPH-MCNC: 4.5 G/DL — SIGNIFICANT CHANGE UP (ref 3.3–5)
ALP SERPL-CCNC: 55 U/L — SIGNIFICANT CHANGE UP (ref 40–120)
ALT FLD-CCNC: 17 U/L — SIGNIFICANT CHANGE UP (ref 4–33)
ANION GAP SERPL CALC-SCNC: 15 MMOL/L — HIGH (ref 7–14)
AST SERPL-CCNC: 20 U/L — SIGNIFICANT CHANGE UP (ref 4–32)
BILIRUB SERPL-MCNC: 0.2 MG/DL — SIGNIFICANT CHANGE UP (ref 0.2–1.2)
BUN SERPL-MCNC: 18 MG/DL — SIGNIFICANT CHANGE UP (ref 7–23)
CALCIUM SERPL-MCNC: 9.4 MG/DL — SIGNIFICANT CHANGE UP (ref 8.4–10.5)
CHLORIDE SERPL-SCNC: 89 MMOL/L — LOW (ref 98–107)
CO2 SERPL-SCNC: 27 MMOL/L — SIGNIFICANT CHANGE UP (ref 22–31)
CREAT SERPL-MCNC: 0.65 MG/DL — SIGNIFICANT CHANGE UP (ref 0.5–1.3)
EGFR: 92 ML/MIN/1.73M2 — SIGNIFICANT CHANGE UP
ESTIMATED AVERAGE GLUCOSE: 120 — SIGNIFICANT CHANGE UP
GLUCOSE SERPL-MCNC: 160 MG/DL — HIGH (ref 70–99)
HCT VFR BLD CALC: 30.4 % — LOW (ref 34.5–45)
HGB BLD-MCNC: 10.1 G/DL — LOW (ref 11.5–15.5)
MCHC RBC-ENTMCNC: 29.6 PG — SIGNIFICANT CHANGE UP (ref 27–34)
MCHC RBC-ENTMCNC: 33.2 GM/DL — SIGNIFICANT CHANGE UP (ref 32–36)
MCV RBC AUTO: 89.1 FL — SIGNIFICANT CHANGE UP (ref 80–100)
NRBC # BLD: 0 /100 WBCS — SIGNIFICANT CHANGE UP (ref 0–0)
NRBC # FLD: 0 K/UL — SIGNIFICANT CHANGE UP (ref 0–0)
PLATELET # BLD AUTO: 367 K/UL — SIGNIFICANT CHANGE UP (ref 150–400)
POTASSIUM SERPL-MCNC: 4.5 MMOL/L — SIGNIFICANT CHANGE UP (ref 3.5–5.3)
POTASSIUM SERPL-SCNC: 4.5 MMOL/L — SIGNIFICANT CHANGE UP (ref 3.5–5.3)
PROT SERPL-MCNC: 7.5 G/DL — SIGNIFICANT CHANGE UP (ref 6–8.3)
RBC # BLD: 3.41 M/UL — LOW (ref 3.8–5.2)
RBC # FLD: 16 % — HIGH (ref 10.3–14.5)
SODIUM SERPL-SCNC: 131 MMOL/L — LOW (ref 135–145)
WBC # BLD: 7.45 K/UL — SIGNIFICANT CHANGE UP (ref 3.8–10.5)
WBC # FLD AUTO: 7.45 K/UL — SIGNIFICANT CHANGE UP (ref 3.8–10.5)

## 2023-01-09 RX ORDER — ATENOLOL 25 MG/1
1 TABLET ORAL
Qty: 0 | Refills: 0 | DISCHARGE

## 2023-01-09 RX ORDER — ALBUTEROL 90 UG/1
2 AEROSOL, METERED ORAL
Qty: 0 | Refills: 0 | DISCHARGE

## 2023-01-09 RX ORDER — SITAGLIPTIN AND METFORMIN HYDROCHLORIDE 500; 50 MG/1; MG/1
1 TABLET, FILM COATED ORAL
Qty: 0 | Refills: 0 | DISCHARGE

## 2023-01-09 RX ORDER — MONTELUKAST 4 MG/1
1 TABLET, CHEWABLE ORAL
Qty: 0 | Refills: 0 | DISCHARGE

## 2023-01-09 RX ORDER — ATORVASTATIN CALCIUM 80 MG/1
1 TABLET, FILM COATED ORAL
Qty: 0 | Refills: 0 | DISCHARGE

## 2023-01-09 RX ORDER — ANASTROZOLE 1 MG/1
1 TABLET ORAL
Qty: 0 | Refills: 0 | DISCHARGE

## 2023-01-09 RX ORDER — TELMISARTAN 20 MG/1
1 TABLET ORAL
Qty: 0 | Refills: 0 | DISCHARGE

## 2023-01-09 NOTE — H&P PST ADULT - HISTORY OF PRESENT ILLNESS
77 year old female with PMH of HTN, Type 2 DM, , Asthma,  breast cancer, seasonal allergies , presents to Presurgical testing with diagnosis of malignant neoplasm uns site uns female breast  scheduled for multiple biopsies of left breast skin.    Surgery was initially scheduled on 10/13/22 but was cancelled as her blood pressure was uncontrolled

## 2023-01-09 NOTE — H&P PST ADULT - PROBLEM SELECTOR PLAN 1
Patient tentatively scheduled for multiple biopsies of left breast skin on 1/17/23.  Pre-op instructions provided. Pt given verbal and written instructions with teach back on chlorhexidine wash and pepcid. Pt verbalized understanding with return demonstration.   Preop Covid PCR test ordered .Instructions regarding covid PCR test to be obtained 3- 5 days prior to surgery and locations for covid testing site provided. Pt verbalized understanding

## 2023-01-09 NOTE — H&P PST ADULT - PROBLEM SELECTOR PLAN 2
Patient instructed to take Nebivolol, telmisartan, Nifedipine with a sip of water on the morning of procedure. Patient verbalized understanding.   Patient obtained cardiology evaluation copy In chart

## 2023-01-09 NOTE — H&P PST ADULT - ATTENDING COMMENTS
77-year-old lady with a history of left breast cancer treated with breast conservation in 2000.  2014 she had a left axillary recurrence treated with resection and postoperative radiation.  August 2022 breast imaging suggested new thickening in the 8:00 position of the left breast for which tissue sampling (skin) was recommended by radiology.  Above operation was scheduled but had to be postponed because of hypertension that was difficult to control.    She presents today for representative Punch biopsies of the lower inner quadrant of the left breast.  Diagnostic concerns, operative approach, risks, benefits, and potential outcomes were reviewed with her prior surgery and again a day of operation.    All questions answered, consent on chart

## 2023-01-09 NOTE — H&P PST ADULT - NSICDXPASTSURGICALHX_GEN_ALL_CORE_FT
PAST SURGICAL HISTORY:  H/O lymph node excision     S/P biopsy of left axillary node in 2014 for left axillary recurrence  with RT    S/P lumpectomy, left breast 2000 with RT

## 2023-01-09 NOTE — H&P PST ADULT - RESPIRATORY
normal/clear to auscultation bilaterally/no wheezes/airway patent/breath sounds equal/respirations non-labored

## 2023-01-16 ENCOUNTER — TRANSCRIPTION ENCOUNTER (OUTPATIENT)
Age: 78
End: 2023-01-16

## 2023-01-16 NOTE — ASU DISCHARGE PLAN (ADULT/PEDIATRIC) - CARE PROVIDER_API CALL
Gallo Rehman)  Surgery  [address] [place] [address]  Phone: [phone]  Fax: [phone]  Follow Up Time:

## 2023-01-17 ENCOUNTER — OUTPATIENT (OUTPATIENT)
Dept: OUTPATIENT SERVICES | Facility: HOSPITAL | Age: 78
LOS: 1 days | Discharge: HOME | End: 2023-01-17
Payer: MEDICARE

## 2023-01-17 ENCOUNTER — RESULT REVIEW (OUTPATIENT)
Age: 78
End: 2023-01-17

## 2023-01-17 ENCOUNTER — APPOINTMENT (OUTPATIENT)
Dept: SURGICAL ONCOLOGY | Facility: AMBULATORY SURGERY CENTER | Age: 78
End: 2023-01-17

## 2023-01-17 VITALS
RESPIRATION RATE: 17 BRPM | SYSTOLIC BLOOD PRESSURE: 121 MMHG | DIASTOLIC BLOOD PRESSURE: 61 MMHG | HEART RATE: 61 BPM | OXYGEN SATURATION: 99 %

## 2023-01-17 VITALS
SYSTOLIC BLOOD PRESSURE: 165 MMHG | OXYGEN SATURATION: 100 % | HEIGHT: 55 IN | TEMPERATURE: 98 F | DIASTOLIC BLOOD PRESSURE: 65 MMHG | HEART RATE: 63 BPM | WEIGHT: 80.91 LBS | RESPIRATION RATE: 18 BRPM

## 2023-01-17 DIAGNOSIS — Z98.89 OTHER SPECIFIED POSTPROCEDURAL STATES: Chronic | ICD-10-CM

## 2023-01-17 DIAGNOSIS — Z98.890 OTHER SPECIFIED POSTPROCEDURAL STATES: Chronic | ICD-10-CM

## 2023-01-17 DIAGNOSIS — C50.919 MALIGNANT NEOPLASM OF UNSPECIFIED SITE OF UNSPECIFIED FEMALE BREAST: ICD-10-CM

## 2023-01-17 LAB
GLUCOSE BLDC GLUCOMTR-MCNC: 102 MG/DL — HIGH (ref 70–99)
SODIUM SERPL-SCNC: 132 MMOL/L — LOW (ref 135–145)

## 2023-01-17 PROCEDURE — 88360 TUMOR IMMUNOHISTOCHEM/MANUAL: CPT | Mod: 26

## 2023-01-17 PROCEDURE — 88305 TISSUE EXAM BY PATHOLOGIST: CPT | Mod: 26

## 2023-01-17 PROCEDURE — 88342 IMHCHEM/IMCYTCHM 1ST ANTB: CPT | Mod: 26,59

## 2023-01-17 PROCEDURE — 88341 IMHCHEM/IMCYTCHM EA ADD ANTB: CPT | Mod: 26,59

## 2023-01-17 PROCEDURE — 11102 TANGNTL BX SKIN SINGLE LES: CPT

## 2023-01-17 PROCEDURE — 11103 TANGNTL BX SKIN EA SEP/ADDL: CPT

## 2023-01-17 RX ORDER — ANASTROZOLE 1 MG/1
1 TABLET ORAL
Qty: 0 | Refills: 0 | DISCHARGE

## 2023-01-17 RX ORDER — NEBIVOLOL HYDROCHLORIDE 5 MG/1
1 TABLET ORAL
Qty: 0 | Refills: 0 | DISCHARGE

## 2023-01-17 RX ORDER — NIFEDIPINE 30 MG
1 TABLET, EXTENDED RELEASE 24 HR ORAL
Qty: 0 | Refills: 0 | DISCHARGE

## 2023-01-17 RX ORDER — ATORVASTATIN CALCIUM 80 MG/1
1 TABLET, FILM COATED ORAL
Qty: 0 | Refills: 0 | DISCHARGE

## 2023-01-17 RX ORDER — ACETAMINOPHEN 500 MG
650 TABLET ORAL ONCE
Refills: 0 | Status: DISCONTINUED | OUTPATIENT
Start: 2023-01-17 | End: 2023-01-31

## 2023-01-17 RX ORDER — SITAGLIPTIN AND METFORMIN HYDROCHLORIDE 500; 50 MG/1; MG/1
2 TABLET, FILM COATED ORAL
Qty: 0 | Refills: 0 | DISCHARGE

## 2023-01-17 RX ORDER — TELMISARTAN 20 MG/1
2 TABLET ORAL
Qty: 0 | Refills: 0 | DISCHARGE

## 2023-01-17 RX ORDER — MONTELUKAST 4 MG/1
1 TABLET, CHEWABLE ORAL
Qty: 0 | Refills: 0 | DISCHARGE

## 2023-01-17 NOTE — ASU PREOPERATIVE ASSESSMENT, ADULT ([facility]ARK ONLY) - FALL HARM RISK - UNIVERSAL INTERVENTIONS
Bed in lowest position, wheels locked, appropriate side rails in place/Call bell, personal items and telephone in reach/Instruct patient to call for assistance before getting out of bed or chair/Non-slip footwear when patient is out of bed/Webb to call system/Physically safe environment - no spills, clutter or unnecessary equipment/Purposeful Proactive Rounding/Room/bathroom lighting operational, light cord in reach

## 2023-01-25 ENCOUNTER — NON-APPOINTMENT (OUTPATIENT)
Age: 78
End: 2023-01-25

## 2023-01-26 ENCOUNTER — NON-APPOINTMENT (OUTPATIENT)
Age: 78
End: 2023-01-26

## 2023-01-26 LAB — SURGICAL PATHOLOGY STUDY: SIGNIFICANT CHANGE UP

## 2023-01-30 NOTE — PHYSICAL EXAM
[Normal] : supple, no neck mass and thyroid not enlarged [Normal Neck Lymph Nodes] : normal neck lymph nodes  [Normal Supraclavicular Lymph Nodes] : normal supraclavicular lymph nodes [Normal Axillary Lymph Nodes] : normal axillary lymph nodes [Normal] : normal appearance, no rash, nodules, vesicles, ulcers, erythema [de-identified] : groins not examined [de-identified] : below

## 2023-01-30 NOTE — HISTORY OF PRESENT ILLNESS
[de-identified] : Had been considering moving to [place], permanently, in the summer 2021\par \par \par 76 year-old nulliparous lady who we see for followup of LEFT BREAST CANCER.\par \par Oncologic history as listed below.\par \par \par She is adopted, and family history is not available\par \par \par 2000: Left breast conserving surgery for stage I carcinoma by [facility], followed by radiation at our facility, and anti-estrogen therapy under the care of Dr Tamayo.\par \par She developed a left axillary recurrence in August 2014 which I resected.\par + Postoperative XRT: Dr. Shaila ROSAS.\par Medical oncology: Dr. Kostas MARRERO -+ adjuvant anastrozole.\par \par April 2022 followup visit with Dr. Marrero was unremarkable\par \par + Prior personal history:\par November 2003: LCIS and ADH on a right breast biopsy\par \par \par Her internist is Dr. Cale PADILLA.\par \par No pacemaker or defibrillator\par Daily baby aspirin.\par \par Presently on anastrozole, commenced in 2015\par \par Asthma is managed by her PMD with Asmanex Ventolin, and Singulair.\par \par Atenolol for hypertension.\par Hypercholesterolemia treated with Lipitor.\par Her cardiologist is Dr. Briana MARTIN\par \par DIABETES controlled with Janumet\par Her endocrinologist is Dr. Jennifer SAAVEDRA\par \par \par Her gynecologist is Dr. Nuzhat JEFFERSON, a visit in May 2022 was unremarkable.\par \par \par January 2017 she had a colonoscopy which was normal, by Dr. Rebel Xavier.\par Okay x10 years.

## 2023-01-30 NOTE — REVIEW OF SYSTEMS
[Negative] : Integumentary [FreeTextEntry5] : Hypertension [de-identified] : Diabetes [FreeTextEntry1] : Breast cancer

## 2023-01-30 NOTE — ASSESSMENT
[FreeTextEntry1] : October 2014 breast MRI and PET scan were unremarkable.\par \par \par July 2021:\par Annual, bilateral, mammogram and sonogram at 450: BI-RADS 2.\par Prescription verified for July 2022.\par \par \par Clinically doing well.\par \par If asymptomatic, with normal imaging, we should see her in another year, sooner if needed.\par \par \par \par 8/30/2022.\par August 24, 2022, Annual, bilateral, mammogram and sonogram at 450: BI-RADS 4.\par "New diffuse skin thickening on the left seen on both mammography and sonography".\par The patient and I subsequently spoke.\par She is asymptomatic without any change on self-examination.\par I was subsequently in touch with breast imaging (Dr. Yan Pryor).\par His correspondence indicates that left breast (8:00) 2 cm from the nipple is where the skin thickening is most prominent.\par I spoke to the patient again and have suggested representative skin biopsies of the region.\par She understands and agrees.\par Paperwork for scheduling submitted electronically.\par \par \par The above surgery was delayed because on the original date, she had significant hypertension.\par \par \par 1/17/2022:\par She had representative biopsies, in concentric arcs, in the lower inner quadrant of the left breast, per my discussion with radiology.\par The pathology of all of the punch biopsies is benign.\par She is doing well from the operation.\par Presently, no further intervention is warranted.\par She will keep her appointment with me in the summer 2023.\par \par \par 
I have reviewed and confirmed nurses' notes...

## 2023-01-30 NOTE — REASON FOR VISIT
[Follow-Up Visit] : a follow-up visit for [Other: _____] : [unfilled] [FreeTextEntry2] : Left breast cancer treated with lumpectomy, subsequent, metachronous, regional recurrence

## 2023-02-07 LAB — HBA1C MFR BLD HPLC: 5.7

## 2023-05-16 ENCOUNTER — APPOINTMENT (OUTPATIENT)
Dept: OBGYN | Facility: CLINIC | Age: 78
End: 2023-05-16

## 2023-05-16 VITALS — WEIGHT: 81 LBS | BODY MASS INDEX: 18.74 KG/M2 | HEIGHT: 55 IN

## 2023-06-05 ENCOUNTER — APPOINTMENT (OUTPATIENT)
Dept: SURGICAL ONCOLOGY | Facility: CLINIC | Age: 78
End: 2023-06-05
Payer: MEDICARE

## 2023-06-05 VITALS
OXYGEN SATURATION: 86 % | WEIGHT: 80 LBS | HEIGHT: 55 IN | RESPIRATION RATE: 17 BRPM | BODY MASS INDEX: 18.52 KG/M2 | HEART RATE: 64 BPM | SYSTOLIC BLOOD PRESSURE: 148 MMHG | DIASTOLIC BLOOD PRESSURE: 76 MMHG

## 2023-06-05 PROCEDURE — 99214 OFFICE O/P EST MOD 30 MIN: CPT

## 2023-06-05 NOTE — REVIEW OF SYSTEMS
[Negative] : Integumentary [FreeTextEntry3] : History of corneal abrasion [FreeTextEntry5] : Hypertension [FreeTextEntry6] : Asthma [de-identified] : Diabetes [FreeTextEntry1] : Breast cancer

## 2023-06-05 NOTE — ASSESSMENT
[FreeTextEntry1] : October 2014 breast MRI and PET scan were unremarkable.\par \par \par Annual breast imaging is due in April 2023.\par Prescriptions entered.\par \par \par Clinically doing well.\par \par If asymptomatic, with normal imaging, we should see her in another year, sooner if needed.

## 2023-06-05 NOTE — REASON FOR VISIT
[Follow-Up Visit] : a follow-up visit for [Other: _____] : [unfilled] [FreeTextEntry2] : Left breast cancer x2 (metachronous regional recurrence)

## 2023-06-05 NOTE — HISTORY OF PRESENT ILLNESS
[de-identified] : Had been considering moving to [place], permanently, in the summer 2021\par \par \par 77 year-old nulliparous lady who we see for followup of LEFT BREAST CANCER.\par \par \par 8/24/2022:\par Annual bilateral mammogram and sonogram at 450: BI-RADS 4.\par "New diffuse skin thickening on the left seen on both mammography and sonography".\par I reviewed this information with the patient and Dr. Yan Pryor from breast imaging.\par The area of concern was located at 8:00, 2 cm from the nipple.\par \par 1/17/2023:\par She had multiple representative punch biopsies of the skin of the left breast (LIQ), all of which were benign.\par Good right now\par \par She returns for scheduled follow-up today.\par \par CC: Today she is asymptomatic\par \par Oncologic history as listed below.\par \par \par She is adopted, and family history is not available\par \par \par 2000: Left breast conserving surgery for stage I carcinoma by [facility], followed by radiation at our facility, and anti-estrogen therapy under the care of Dr Tamayo.\par \par She developed a left axillary recurrence in August 2014 which I resected.\par + Postoperative XRT: Dr. Shaila ROSAS.\par Medical oncology: Dr. Kostas MARRERO -+ adjuvant anastrozole.\par \par April 2022 followup visit with Dr. Marrero was unremarkable\par \par + Prior personal history:\par November 2003: LCIS and ADH on a right breast biopsy\par \par \par PMD: Dr. Franco RICO.\par She used to see Dr. Cale Michelle, but had to change because of difficulty scheduling appointments through the office\par \par No pacemaker or defibrillator\par Daily baby aspirin.\par \par Presently on anastrozole, commenced in 2015\par \par Asthma is managed by her PMD with Asmanex Ventolin, and Singulair.\par \par Atenolol for hypertension.\par Hypercholesterolemia treated with Lipitor.\par Her cardiologist is Dr. Briana MARTIN\par \par DIABETES controlled with Janumet\par Her endocrinologist is Dr. Jennifer SAAVEDRA.\par \par December 2022 she had a corneal abrasion.\par She was seen and treated by ophthalmology (Dr. Handy EISENBERG)\par \par \par Her gynecologist is Dr. Nuzhat JEFFERSON, a visit in May 2022 was unremarkable.\par \par \par January 2017 she had a colonoscopy which was normal, by Dr. Rebel Xavier.\par Okay x10 years.

## 2023-06-05 NOTE — PHYSICAL EXAM
[Normal] : supple, no neck mass and thyroid not enlarged [Normal Neck Lymph Nodes] : normal neck lymph nodes  [Normal Supraclavicular Lymph Nodes] : normal supraclavicular lymph nodes [Normal Axillary Lymph Nodes] : normal axillary lymph nodes [Normal] : normal appearance, no rash, nodules, vesicles, ulcers, erythema [de-identified] : groins not examined [de-identified] : below

## 2023-07-24 ENCOUNTER — OUTPATIENT (OUTPATIENT)
Dept: OUTPATIENT SERVICES | Facility: HOSPITAL | Age: 78
LOS: 1 days | Discharge: HOME | End: 2023-07-24

## 2023-07-24 DIAGNOSIS — Z98.89 OTHER SPECIFIED POSTPROCEDURAL STATES: Chronic | ICD-10-CM

## 2023-07-24 DIAGNOSIS — C50.919 MALIGNANT NEOPLASM OF UNSPECIFIED SITE OF UNSPECIFIED FEMALE BREAST: ICD-10-CM

## 2023-07-24 DIAGNOSIS — Z98.890 OTHER SPECIFIED POSTPROCEDURAL STATES: Chronic | ICD-10-CM

## 2023-08-02 ENCOUNTER — APPOINTMENT (OUTPATIENT)
Dept: HEMATOLOGY ONCOLOGY | Facility: CLINIC | Age: 78
End: 2023-08-02
Payer: MEDICARE

## 2023-08-02 ENCOUNTER — RESULT REVIEW (OUTPATIENT)
Age: 78
End: 2023-08-02

## 2023-08-02 VITALS
TEMPERATURE: 97.5 F | RESPIRATION RATE: 16 BRPM | DIASTOLIC BLOOD PRESSURE: 68 MMHG | OXYGEN SATURATION: 96 % | WEIGHT: 79.37 LBS | SYSTOLIC BLOOD PRESSURE: 167 MMHG | BODY MASS INDEX: 19.87 KG/M2 | HEART RATE: 64 BPM

## 2023-08-02 DIAGNOSIS — R25.2 CRAMP AND SPASM: ICD-10-CM

## 2023-08-02 LAB
BASOPHILS # BLD AUTO: 0.07 K/UL — SIGNIFICANT CHANGE UP (ref 0–0.2)
BASOPHILS NFR BLD AUTO: 1.2 % — SIGNIFICANT CHANGE UP (ref 0–2)
EOSINOPHIL # BLD AUTO: 0.13 K/UL — SIGNIFICANT CHANGE UP (ref 0–0.5)
EOSINOPHIL NFR BLD AUTO: 2.3 % — SIGNIFICANT CHANGE UP (ref 0–6)
HCT VFR BLD CALC: 30.6 % — LOW (ref 34.5–45)
HGB BLD-MCNC: 9.9 G/DL — LOW (ref 11.5–15.5)
IMM GRANULOCYTES NFR BLD AUTO: 0.2 % — SIGNIFICANT CHANGE UP (ref 0–0.9)
LYMPHOCYTES # BLD AUTO: 1.09 K/UL — SIGNIFICANT CHANGE UP (ref 1–3.3)
LYMPHOCYTES # BLD AUTO: 19.4 % — SIGNIFICANT CHANGE UP (ref 13–44)
MCHC RBC-ENTMCNC: 30.3 PG — SIGNIFICANT CHANGE UP (ref 27–34)
MCHC RBC-ENTMCNC: 32.4 G/DL — SIGNIFICANT CHANGE UP (ref 32–36)
MCV RBC AUTO: 93.6 FL — SIGNIFICANT CHANGE UP (ref 80–100)
MONOCYTES # BLD AUTO: 0.68 K/UL — SIGNIFICANT CHANGE UP (ref 0–0.9)
MONOCYTES NFR BLD AUTO: 12.1 % — SIGNIFICANT CHANGE UP (ref 2–14)
NEUTROPHILS # BLD AUTO: 3.64 K/UL — SIGNIFICANT CHANGE UP (ref 1.8–7.4)
NEUTROPHILS NFR BLD AUTO: 64.8 % — SIGNIFICANT CHANGE UP (ref 43–77)
NRBC # BLD: 0 /100 WBCS — SIGNIFICANT CHANGE UP (ref 0–0)
PLATELET # BLD AUTO: 291 K/UL — SIGNIFICANT CHANGE UP (ref 150–400)
RBC # BLD: 3.27 M/UL — LOW (ref 3.8–5.2)
RBC # FLD: 14.2 % — SIGNIFICANT CHANGE UP (ref 10.3–14.5)
WBC # BLD: 5.62 K/UL — SIGNIFICANT CHANGE UP (ref 3.8–10.5)
WBC # FLD AUTO: 5.62 K/UL — SIGNIFICANT CHANGE UP (ref 3.8–10.5)

## 2023-08-02 PROCEDURE — 99214 OFFICE O/P EST MOD 30 MIN: CPT

## 2023-08-02 NOTE — PHYSICAL EXAM
[Fully active, able to carry on all pre-disease performance without restriction] : Status 0 - Fully active, able to carry on all pre-disease performance without restriction [Normal] : affect appropriate [de-identified] : no abnl masses palpable or axillary LN palpable; scar 6:00 left breast

## 2023-08-02 NOTE — ASSESSMENT
[FreeTextEntry1] : She is a 75 y/o F with recurrent left axillary LN breast cancer s/p resection and radiation. She has been on anastrozole since 1/2015 without any new signs or symptoms of breast cancer recurrence. She will have annual imaging done this month. She has bone density 2022: osteopenia. We reviewed calcium and Vitamin D supplementation along with exercise to maintain bone health. We reviewed leg cramping and supportive measures; heat therapy / hydration and magnesium. Will obtain interval blood work to check electrolytes and iron. Questions answered to her satisfaction. She is agreeable with plan. Next follow up in 1 year but earlier if any new symptoms.

## 2023-08-02 NOTE — HISTORY OF PRESENT ILLNESS
[Disease: _____________________] : Disease: [unfilled] [AJCC Stage: ____] : AJCC Stage: [unfilled] [de-identified] : She was initially evaluated in 10/2014 for left breast cancer.  She had a history of multifocal lobular carcinoma in situ and had left lumpectomy with axillary lymph node dissection in 4/25/2000.  She received radiation to the breast at [facility] and was treated with tamoxifen for 5 years with Dr Tamayo in [place].  On 7/2014, she had abnormal mammogram findings and she had FNA performed at the 1:00 axillary nodule which was positive for malignant cells.  On 8/29/2014, she had had left lymph node biopsy  that showed 1/2 lymph nodes positive for disease that was ER 90%, MA <5%, [id number] negative.  She had PET/ CT performed on 10/2014 and MRI of the breast which showed post surgical changes to the left axilla but could not identify disease focus.  We reviewed chemotherapy options for LN positive breast cancer which she refused.  We offered her endocrine therapy and referred to radiation evaluation.  She completed radiation with Dr Basilio and started on anastrozole since 1/2015.  [de-identified] : Anastrozole 1/2015 to present  [de-identified] : ER >90%, [place] <5%, [id number] negative [de-identified] : She had interval breast imaging done in August. She had skin biopsies over the breast done in January which showed dermal sclerosis. Has been noticing more leg cramping over the past year. She already takes magnesium 500mg three times a day. Leg cramps that improve with hydration. Denies any new breast pain or chest wall changes. No back pain, cough or HA. Had blood work done by endocrine but no recent blood work. Denies any new medications. Had bone density last year showing osteopenia.

## 2023-08-02 NOTE — REVIEW OF SYSTEMS
[Diarrhea: Grade 0] : Diarrhea: Grade 0 [Negative] : Allergic/Immunologic [Joint Pain] : no joint pain [Joint Stiffness] : no joint stiffness [Muscle Pain] : no muscle pain [Muscle Weakness] : no muscle weakness [FreeTextEntry9] : leg cramping

## 2023-08-03 ENCOUNTER — LABORATORY RESULT (OUTPATIENT)
Age: 78
End: 2023-08-03

## 2023-08-03 DIAGNOSIS — D64.9 ANEMIA, UNSPECIFIED: ICD-10-CM

## 2023-08-03 LAB
ALBUMIN SERPL ELPH-MCNC: 4.9 G/DL
ALP BLD-CCNC: 59 U/L
ALT SERPL-CCNC: 18 U/L
ANION GAP SERPL CALC-SCNC: 12 MMOL/L
AST SERPL-CCNC: 19 U/L
BILIRUB SERPL-MCNC: 0.3 MG/DL
BUN SERPL-MCNC: 18 MG/DL
CALCIUM SERPL-MCNC: 10 MG/DL
CHLORIDE SERPL-SCNC: 98 MMOL/L
CO2 SERPL-SCNC: 26 MMOL/L
CREAT SERPL-MCNC: 0.62 MG/DL
EGFR: 93 ML/MIN/1.73M2
GLUCOSE SERPL-MCNC: 96 MG/DL
IRON SERPL-MCNC: 42 UG/DL
MAGNESIUM SERPL-MCNC: 2 MG/DL
POTASSIUM SERPL-SCNC: 5.1 MMOL/L
PROT SERPL-MCNC: 7.4 G/DL
SODIUM SERPL-SCNC: 135 MMOL/L

## 2023-08-07 LAB
FOLATE SERPL-MCNC: >20 NG/ML
VIT B12 SERPL-MCNC: 345 PG/ML

## 2023-08-09 LAB
ALBUMIN MFR SERPL ELPH: 58.2 %
ALBUMIN SERPL-MCNC: 4.2 G/DL
ALBUMIN/GLOB SERPL: 1.4 RATIO
ALPHA1 GLOB MFR SERPL ELPH: 4 %
ALPHA1 GLOB SERPL ELPH-MCNC: 0.3 G/DL
ALPHA2 GLOB MFR SERPL ELPH: 11.7 %
ALPHA2 GLOB SERPL ELPH-MCNC: 0.9 G/DL
B-GLOBULIN MFR SERPL ELPH: 15.2 %
B-GLOBULIN SERPL ELPH-MCNC: 1.1 G/DL
DEPRECATED KAPPA LC FREE/LAMBDA SER: 1.03 RATIO
GAMMA GLOB FLD ELPH-MCNC: 0.8 G/DL
GAMMA GLOB MFR SERPL ELPH: 10.9 %
IGA SER QL IEP: 237 MG/DL
IGG SER QL IEP: 964 MG/DL
IGM SER QL IEP: 32 MG/DL
INTERPRETATION SERPL IEP-IMP: NORMAL
KAPPA LC CSF-MCNC: 2.17 MG/DL
KAPPA LC SERPL-MCNC: 2.24 MG/DL
M PROTEIN MFR SERPL ELPH: NORMAL
M PROTEIN SPEC IFE-MCNC: NORMAL
MONOCLON BAND OBS SERPL: NORMAL
PROT SERPL-MCNC: 7.3 G/DL
PROT SERPL-MCNC: 7.3 G/DL

## 2023-08-29 ENCOUNTER — RESULT REVIEW (OUTPATIENT)
Age: 78
End: 2023-08-29

## 2023-08-29 ENCOUNTER — APPOINTMENT (OUTPATIENT)
Dept: MAMMOGRAPHY | Facility: IMAGING CENTER | Age: 78
End: 2023-08-29
Payer: MEDICARE

## 2023-08-29 ENCOUNTER — APPOINTMENT (OUTPATIENT)
Dept: ULTRASOUND IMAGING | Facility: IMAGING CENTER | Age: 78
End: 2023-08-29
Payer: MEDICARE

## 2023-08-29 ENCOUNTER — OUTPATIENT (OUTPATIENT)
Dept: OUTPATIENT SERVICES | Facility: HOSPITAL | Age: 78
LOS: 1 days | End: 2023-08-29
Payer: MEDICARE

## 2023-08-29 DIAGNOSIS — Z98.890 OTHER SPECIFIED POSTPROCEDURAL STATES: Chronic | ICD-10-CM

## 2023-08-29 DIAGNOSIS — C50.919 MALIGNANT NEOPLASM OF UNSPECIFIED SITE OF UNSPECIFIED FEMALE BREAST: ICD-10-CM

## 2023-08-29 DIAGNOSIS — Z98.89 OTHER SPECIFIED POSTPROCEDURAL STATES: Chronic | ICD-10-CM

## 2023-08-29 PROCEDURE — 77063 BREAST TOMOSYNTHESIS BI: CPT | Mod: 26

## 2023-08-29 PROCEDURE — 77063 BREAST TOMOSYNTHESIS BI: CPT

## 2023-08-29 PROCEDURE — 77067 SCR MAMMO BI INCL CAD: CPT | Mod: 26

## 2023-08-29 PROCEDURE — 77067 SCR MAMMO BI INCL CAD: CPT

## 2023-08-29 PROCEDURE — 76641 ULTRASOUND BREAST COMPLETE: CPT | Mod: 26,50,GZ

## 2023-08-29 PROCEDURE — 76641 ULTRASOUND BREAST COMPLETE: CPT

## 2023-10-17 ENCOUNTER — NON-APPOINTMENT (OUTPATIENT)
Age: 78
End: 2023-10-17

## 2023-10-17 ENCOUNTER — APPOINTMENT (OUTPATIENT)
Dept: OPHTHALMOLOGY | Facility: CLINIC | Age: 78
End: 2023-10-17
Payer: MEDICARE

## 2023-10-17 PROCEDURE — 92014 COMPRE OPH EXAM EST PT 1/>: CPT

## 2023-12-21 ENCOUNTER — NON-APPOINTMENT (OUTPATIENT)
Age: 78
End: 2023-12-21

## 2024-06-10 ENCOUNTER — APPOINTMENT (OUTPATIENT)
Dept: SURGICAL ONCOLOGY | Facility: CLINIC | Age: 79
End: 2024-06-10
Payer: MEDICARE

## 2024-06-10 VITALS
HEIGHT: 55 IN | DIASTOLIC BLOOD PRESSURE: 75 MMHG | HEART RATE: 57 BPM | OXYGEN SATURATION: 97 % | BODY MASS INDEX: 18.05 KG/M2 | WEIGHT: 78 LBS | SYSTOLIC BLOOD PRESSURE: 120 MMHG

## 2024-06-10 DIAGNOSIS — C50.919 MALIGNANT NEOPLASM OF UNSPECIFIED SITE OF UNSPECIFIED FEMALE BREAST: ICD-10-CM

## 2024-06-10 PROCEDURE — 99214 OFFICE O/P EST MOD 30 MIN: CPT

## 2024-06-11 NOTE — REVIEW OF SYSTEMS
[Negative] : Integumentary [FreeTextEntry3] : History of corneal abrasion [FreeTextEntry5] : Hypertension [FreeTextEntry6] : Asthma [FreeTextEntry7] : Allergic to penicillin [de-identified] : Nulliparous [FreeTextEntry1] : Breast cancer

## 2024-06-11 NOTE — ASSESSMENT
[FreeTextEntry1] : 78-year-old lady.  Follow-up for left breast cancer with a metachronous regional recurrence.  Today she is asymptomatic with a normal physical examination.  October 2014 breast MRI and PET scan were unremarkable.  8/29/2023: Bilateral mammogram and sonogram at 450: BI-RADS 2. Prescriptions entered/provided today for August 2024   Clinically doing well.  If asymptomatic, with normal imaging, we should see her in another year, sooner if needed.  Reviewed in detail, all questions answered.

## 2024-06-11 NOTE — HISTORY OF PRESENT ILLNESS
[de-identified] : Had been considering moving to [place], permanently, in the summer 2021.   77-year-old lady.  Breast cancer follow-up.  Annual examination.  CC: Today she is asymptomatic.   + History of LEFT BREAST CANCER (below).   8/24/2022: Annual bilateral mammogram and sonogram at 450: BI-RADS 4. "New diffuse skin thickening on the left seen on both mammography and sonography". I reviewed this information with the patient and Dr. Yan Pryor from breast imaging. The area of concern was located at 8:00, 2 cm from the nipple.  1/17/2023: She had multiple representative punch biopsies of the skin of the left breast (LIQ), all of which were benign.   2000:  Left breast conserving surgery for stage I carcinoma by [facility]. + XRT@450. + Anti-estrogen therapy under the care of Dr Tamayo.  August 2014: She developed a left axillary recurrence which I resected. + Postoperative XRT: Dr. Shaila ROSAS. Medical oncology: Dr. Kostas DE LA ROSA -+ adjuvant anastrozole.   + Additional prior personal history: November 2003: LCIS and ADH on a right breast biopsy.   No other personal history of malignancy.   She is adopted, and family history is not available.   Nulliparous.   PMD: Dr. Franco RICO. She used to see Dr. Cale Michelle, but had to change because of difficulty scheduling appointments through the office.  + ALLERGIC: Penicillin  No pacemaker or defibrillator.  No anticoagulants.  Daily baby aspirin.  Presently on anastrozole, commenced in 2015.  Asthma is managed by her PMD with Asmanex Ventolin, and Singulair.  Atenolol for hypertension. Hypercholesterolemia treated with Lipitor. Her cardiologist is Dr. Briana MARTIN.  DIABETES controlled with Janumet. Her endocrinologist is Dr. Jennifer SAAVEDRA.  December 2022, she had a corneal abrasion. She was seen and treated by ophthalmology (Dr. Handy EISENBERG).   Her gynecologist is Dr. Nuzhat JEFFERSON, a visit in May 2022 was unremarkable. She will be scheduling a follow-up visit for the summer 2024.   January 2017 she had a colonoscopy which was normal, by Dr. Rebel Xavier. Okay x10 years.

## 2024-06-11 NOTE — REASON FOR VISIT
[Follow-Up Visit] : a follow-up visit for [Other: _____] : [unfilled] [FreeTextEntry2] : Left breast cancer with metachronous axillary recurrence

## 2024-06-11 NOTE — PHYSICAL EXAM
[Normal] : supple, no neck mass and thyroid not enlarged [Normal Neck Lymph Nodes] : normal neck lymph nodes  [Normal Supraclavicular Lymph Nodes] : normal supraclavicular lymph nodes [Normal Axillary Lymph Nodes] : normal axillary lymph nodes [Normal] : normal appearance, no rash, nodules, vesicles, ulcers, erythema [de-identified] : groins not examined [de-identified] : below

## 2024-07-16 ENCOUNTER — APPOINTMENT (OUTPATIENT)
Dept: OBGYN | Facility: CLINIC | Age: 79
End: 2024-07-16
Payer: MEDICARE

## 2024-07-16 VITALS
WEIGHT: 78.13 LBS | SYSTOLIC BLOOD PRESSURE: 170 MMHG | BODY MASS INDEX: 18.08 KG/M2 | HEIGHT: 55 IN | DIASTOLIC BLOOD PRESSURE: 74 MMHG

## 2024-07-16 DIAGNOSIS — Z01.419 ENCOUNTER FOR GYNECOLOGICAL EXAMINATION (GENERAL) (ROUTINE) W/OUT ABNORMAL FINDINGS: ICD-10-CM

## 2024-07-16 PROCEDURE — G0101: CPT

## 2024-07-23 LAB — CYTOLOGY CVX/VAG DOC THIN PREP: ABNORMAL

## 2024-08-03 ENCOUNTER — OUTPATIENT (OUTPATIENT)
Dept: OUTPATIENT SERVICES | Facility: HOSPITAL | Age: 79
LOS: 1 days | Discharge: HOME | End: 2024-08-03

## 2024-08-03 DIAGNOSIS — Z98.89 OTHER SPECIFIED POSTPROCEDURAL STATES: Chronic | ICD-10-CM

## 2024-08-03 DIAGNOSIS — Z98.890 OTHER SPECIFIED POSTPROCEDURAL STATES: Chronic | ICD-10-CM

## 2024-08-03 DIAGNOSIS — C50.919 MALIGNANT NEOPLASM OF UNSPECIFIED SITE OF UNSPECIFIED FEMALE BREAST: ICD-10-CM

## 2024-08-07 ENCOUNTER — APPOINTMENT (OUTPATIENT)
Dept: HEMATOLOGY ONCOLOGY | Facility: CLINIC | Age: 79
End: 2024-08-07

## 2024-08-07 PROCEDURE — 99214 OFFICE O/P EST MOD 30 MIN: CPT

## 2024-08-07 PROCEDURE — G2211 COMPLEX E/M VISIT ADD ON: CPT

## 2024-08-11 NOTE — REVIEW OF SYSTEMS
[Muscle Pain] : muscle pain [Negative] : Allergic/Immunologic [Fever] : no fever [Chills] : no chills [Night Sweats] : no night sweats [Joint Pain] : no joint pain [Joint Stiffness] : no joint stiffness [Muscle Weakness] : no muscle weakness [FreeTextEntry2] : decrease energy  [de-identified] : cold hand and feet- transient [FreeTextEntry9] : leg cramping

## 2024-08-11 NOTE — ASSESSMENT
[FreeTextEntry1] : She is a 77 y/o F with recurrent left axillary LN breast cancer s/p resection and radiation. She has been on anastrozole since 1/2015 without any new signs or symptoms of breast cancer recurrence. We previously reviewed calcium and Vitamin D supplementation along with exercise to maintain bone health. We will obtain copy of blood work from her endocrinologist. Will plan for bone density every 2 years- will obtain from endocrinologist.   She understands if any new symptoms she will call. Next follow up in 1 year but earlier if any new symptoms.   Tatiana Dupree MD Hematology Oncology Fellow, PGY-4

## 2024-08-11 NOTE — CONSULT LETTER
[Dear  ___] : Dear  [unfilled], [Courtesy Letter:] : I had the pleasure of seeing your patient, [unfilled], in my office today. [Please see my note below.] : Please see my note below. [Consult Closing:] : Thank you very much for allowing me to participate in the care of this patient.  If you have any questions, please do not hesitate to contact me. [Sincerely,] : Sincerely, [FreeTextEntry2] : Franco Wolfe MD [address] [address] [FreeTextEntry3] : Kostas Marrero MD Attending [facility]

## 2024-08-11 NOTE — END OF VISIT
[Time Spent: ___ minutes] : I have spent [unfilled] minutes of time on the encounter. [] : Fellow [FreeTextEntry3] : She continues with anastrozole and has no new signs or symptoms of recurrence. She will have her breast imaging in September.

## 2024-08-11 NOTE — HISTORY OF PRESENT ILLNESS
[Disease: _____________________] : Disease: [unfilled] [AJCC Stage: ____] : AJCC Stage: [unfilled] [de-identified] : She was initially evaluated in 10/2014 for left breast cancer.  She had a history of multifocal lobular carcinoma in situ and had left lumpectomy with axillary lymph node dissection in 4/25/2000.  She received radiation to the breast at [facility] and was treated with tamoxifen for 5 years with Dr Tamayo in [place].  On 7/2014, she had abnormal mammogram findings and she had FNA performed at the 1:00 axillary nodule which was positive for malignant cells.  On 8/29/2014, she had had left lymph node biopsy  that showed 1/2 lymph nodes positive for disease that was ER 90%, RI <5%, [id number] negative.  She had PET/ CT performed on 10/2014 and MRI of the breast which showed post surgical changes to the left axilla but could not identify disease focus.  We reviewed chemotherapy options for LN positive breast cancer which she refused.  We offered her endocrine therapy and referred to radiation evaluation.  She completed radiation with Dr Basilio and started on anastrozole since 1/2015.  [de-identified] : ER >90%, AK <5%, [id number] negative [de-identified] : Anastrozole 1/2015 to present  [de-identified] : She is currently taking nebivolol and telmisartan for BP. She denies any chest wall masses/tenderness and breast pain/masses. She denies GI upset, consitpation, bone pain, HA, cough. She reports occasional cramping in her legs that is alleviated with hydration and magensium. She is currently plan to get bone density with endocrinologist. She also recently changed her primary care physician Dr. Wolfe and had blood-work recently.

## 2024-08-11 NOTE — REVIEW OF SYSTEMS
[Muscle Pain] : muscle pain [Negative] : Allergic/Immunologic [Fever] : no fever [Chills] : no chills [Night Sweats] : no night sweats [Joint Pain] : no joint pain [Joint Stiffness] : no joint stiffness [Muscle Weakness] : no muscle weakness [FreeTextEntry2] : decrease energy  [FreeTextEntry9] : leg cramping  [de-identified] : cold hand and feet- transient

## 2024-08-11 NOTE — END OF VISIT
[Time Spent: ___ minutes] : I have spent [unfilled] minutes of time on the encounter. [FreeTextEntry3] : She continues with anastrozole and has no new signs or symptoms of recurrence. She will have her breast imaging in September.  [] : Fellow

## 2024-08-11 NOTE — HISTORY OF PRESENT ILLNESS
[Disease: _____________________] : Disease: [unfilled] [AJCC Stage: ____] : AJCC Stage: [unfilled] [de-identified] : She was initially evaluated in 10/2014 for left breast cancer.  She had a history of multifocal lobular carcinoma in situ and had left lumpectomy with axillary lymph node dissection in 4/25/2000.  She received radiation to the breast at [facility] and was treated with tamoxifen for 5 years with Dr Tamayo in [place].  On 7/2014, she had abnormal mammogram findings and she had FNA performed at the 1:00 axillary nodule which was positive for malignant cells.  On 8/29/2014, she had had left lymph node biopsy  that showed 1/2 lymph nodes positive for disease that was ER 90%, MT <5%, [id number] negative.  She had PET/ CT performed on 10/2014 and MRI of the breast which showed post surgical changes to the left axilla but could not identify disease focus.  We reviewed chemotherapy options for LN positive breast cancer which she refused.  We offered her endocrine therapy and referred to radiation evaluation.  She completed radiation with Dr Basilio and started on anastrozole since 1/2015.  [de-identified] : ER >90%, MN <5%, [id number] negative [de-identified] : Anastrozole 1/2015 to present  [de-identified] : She is currently taking nebivolol and telmisartan for BP. She denies any chest wall masses/tenderness and breast pain/masses. She denies GI upset, consitpation, bone pain, HA, cough. She reports occasional cramping in her legs that is alleviated with hydration and magensium. She is currently plan to get bone density with endocrinologist. She also recently changed her primary care physician Dr. Wolfe and had blood-work recently.

## 2024-08-11 NOTE — PHYSICAL EXAM
[Fully active, able to carry on all pre-disease performance without restriction] : Status 0 - Fully active, able to carry on all pre-disease performance without restriction [Normal] : grossly intact [de-identified] : no abnl masses palpable or axillary LN palpable; scar 6:00 left breast  [de-identified] : soft compressible dime size lumps to legs consistent with varicose veins b/l.

## 2024-08-11 NOTE — PHYSICAL EXAM
[Fully active, able to carry on all pre-disease performance without restriction] : Status 0 - Fully active, able to carry on all pre-disease performance without restriction [Normal] : affect appropriate [de-identified] : no abnl masses palpable or axillary LN palpable; scar 6:00 left breast  [de-identified] : soft compressible dime size lumps to legs consistent with varicose veins b/l.

## 2024-08-11 NOTE — CONSULT LETTER
[Dear  ___] : Dear  [unfilled], [Courtesy Letter:] : I had the pleasure of seeing your patient, [unfilled], in my office today. [Please see my note below.] : Please see my note below. [Consult Closing:] : Thank you very much for allowing me to participate in the care of this patient.  If you have any questions, please do not hesitate to contact me. [Sincerely,] : Sincerely, [FreeTextEntry2] : Franco Wolfe MD [address] [address] [FreeTextEntry3] : Kostas Marrero MD Attending [facility]

## 2024-09-03 ENCOUNTER — APPOINTMENT (OUTPATIENT)
Dept: MAMMOGRAPHY | Facility: IMAGING CENTER | Age: 79
End: 2024-09-03
Payer: MEDICARE

## 2024-09-03 ENCOUNTER — RESULT REVIEW (OUTPATIENT)
Age: 79
End: 2024-09-03

## 2024-09-03 ENCOUNTER — OUTPATIENT (OUTPATIENT)
Dept: OUTPATIENT SERVICES | Facility: HOSPITAL | Age: 79
LOS: 1 days | End: 2024-09-03
Payer: MEDICARE

## 2024-09-03 ENCOUNTER — APPOINTMENT (OUTPATIENT)
Dept: ULTRASOUND IMAGING | Facility: IMAGING CENTER | Age: 79
End: 2024-09-03
Payer: MEDICARE

## 2024-09-03 DIAGNOSIS — Z00.8 ENCOUNTER FOR OTHER GENERAL EXAMINATION: ICD-10-CM

## 2024-09-03 PROCEDURE — 77067 SCR MAMMO BI INCL CAD: CPT | Mod: 26

## 2024-09-03 PROCEDURE — 76641 ULTRASOUND BREAST COMPLETE: CPT | Mod: 26,50,GY

## 2024-09-03 PROCEDURE — 77063 BREAST TOMOSYNTHESIS BI: CPT

## 2024-09-03 PROCEDURE — 77063 BREAST TOMOSYNTHESIS BI: CPT | Mod: 26

## 2024-09-03 PROCEDURE — 76641 ULTRASOUND BREAST COMPLETE: CPT

## 2024-09-03 PROCEDURE — 77067 SCR MAMMO BI INCL CAD: CPT

## 2024-09-04 PROBLEM — R92.8 ABNORMAL FINDING ON BREAST IMAGING: Status: ACTIVE | Noted: 2024-09-04

## 2024-10-07 ENCOUNTER — NON-APPOINTMENT (OUTPATIENT)
Age: 79
End: 2024-10-07

## 2024-10-29 ENCOUNTER — NON-APPOINTMENT (OUTPATIENT)
Age: 79
End: 2024-10-29

## 2024-10-29 ENCOUNTER — APPOINTMENT (OUTPATIENT)
Dept: OPHTHALMOLOGY | Facility: CLINIC | Age: 79
End: 2024-10-29
Payer: MEDICARE

## 2024-10-29 PROCEDURE — 92014 COMPRE OPH EXAM EST PT 1/>: CPT

## 2024-12-23 NOTE — ASU PREOPERATIVE ASSESSMENT, ADULT (IPARK ONLY) - HEIGHT IN CM
I reviewed Joanne's visit note from 12/18 and see that she wanted to start pt on Adipex. I called and notified pt that Dr. Moreno is out and will be back on Thursday. She verbalized understanding.    [phone]

## 2025-02-13 ENCOUNTER — TRANSCRIPTION ENCOUNTER (OUTPATIENT)
Age: 80
End: 2025-02-13

## 2025-03-11 ENCOUNTER — OUTPATIENT (OUTPATIENT)
Dept: OUTPATIENT SERVICES | Facility: HOSPITAL | Age: 80
LOS: 1 days | End: 2025-03-11
Payer: MEDICARE

## 2025-03-11 ENCOUNTER — RESULT REVIEW (OUTPATIENT)
Age: 80
End: 2025-03-11

## 2025-03-11 ENCOUNTER — APPOINTMENT (OUTPATIENT)
Dept: ULTRASOUND IMAGING | Facility: IMAGING CENTER | Age: 80
End: 2025-03-11
Payer: MEDICARE

## 2025-03-11 DIAGNOSIS — Z98.89 OTHER SPECIFIED POSTPROCEDURAL STATES: Chronic | ICD-10-CM

## 2025-03-11 DIAGNOSIS — Z98.890 OTHER SPECIFIED POSTPROCEDURAL STATES: Chronic | ICD-10-CM

## 2025-03-11 DIAGNOSIS — R92.8 OTHER ABNORMAL AND INCONCLUSIVE FINDINGS ON DIAGNOSTIC IMAGING OF BREAST: ICD-10-CM

## 2025-03-11 PROCEDURE — 76642 ULTRASOUND BREAST LIMITED: CPT

## 2025-03-11 PROCEDURE — 76642 ULTRASOUND BREAST LIMITED: CPT | Mod: 26,RT

## 2025-03-26 ENCOUNTER — TRANSCRIPTION ENCOUNTER (OUTPATIENT)
Age: 80
End: 2025-03-26

## 2025-06-16 ENCOUNTER — NON-APPOINTMENT (OUTPATIENT)
Age: 80
End: 2025-06-16

## 2025-06-16 ENCOUNTER — APPOINTMENT (OUTPATIENT)
Dept: SURGICAL ONCOLOGY | Facility: CLINIC | Age: 80
End: 2025-06-16
Payer: MEDICARE

## 2025-06-16 VITALS
BODY MASS INDEX: 17.59 KG/M2 | HEART RATE: 55 BPM | SYSTOLIC BLOOD PRESSURE: 171 MMHG | WEIGHT: 76 LBS | DIASTOLIC BLOOD PRESSURE: 66 MMHG | HEIGHT: 55 IN | OXYGEN SATURATION: 99 %

## 2025-06-16 PROCEDURE — 99214 OFFICE O/P EST MOD 30 MIN: CPT

## 2025-08-06 ENCOUNTER — APPOINTMENT (OUTPATIENT)
Dept: HEMATOLOGY ONCOLOGY | Facility: CLINIC | Age: 80
End: 2025-08-06
Payer: MEDICARE

## 2025-08-06 VITALS
BODY MASS INDEX: 17.09 KG/M2 | OXYGEN SATURATION: 96 % | WEIGHT: 73.85 LBS | TEMPERATURE: 97.7 F | HEIGHT: 55 IN | RESPIRATION RATE: 16 BRPM | DIASTOLIC BLOOD PRESSURE: 69 MMHG | HEART RATE: 66 BPM | SYSTOLIC BLOOD PRESSURE: 161 MMHG

## 2025-08-06 DIAGNOSIS — C50.919 MALIGNANT NEOPLASM OF UNSPECIFIED SITE OF UNSPECIFIED FEMALE BREAST: ICD-10-CM

## 2025-08-06 PROCEDURE — G2211 COMPLEX E/M VISIT ADD ON: CPT

## 2025-08-06 PROCEDURE — 99214 OFFICE O/P EST MOD 30 MIN: CPT

## 2025-08-06 RX ORDER — NIFEDIPINE 30 MG/1
30 TABLET, EXTENDED RELEASE ORAL
Refills: 0 | Status: ACTIVE | COMMUNITY
Start: 2025-08-06

## 2025-08-06 RX ORDER — NEBIVOLOL 10 MG/1
10 TABLET ORAL DAILY
Refills: 0 | Status: ACTIVE | COMMUNITY
Start: 2025-08-06

## 2025-09-16 ENCOUNTER — APPOINTMENT (OUTPATIENT)
Dept: ULTRASOUND IMAGING | Facility: IMAGING CENTER | Age: 80
End: 2025-09-16
Payer: MEDICARE

## 2025-09-16 ENCOUNTER — RESULT REVIEW (OUTPATIENT)
Age: 80
End: 2025-09-16

## 2025-09-16 ENCOUNTER — APPOINTMENT (OUTPATIENT)
Dept: MAMMOGRAPHY | Facility: IMAGING CENTER | Age: 80
End: 2025-09-16
Payer: MEDICARE

## 2025-09-16 PROCEDURE — G0279: CPT | Mod: 26

## 2025-09-16 PROCEDURE — 77066 DX MAMMO INCL CAD BI: CPT | Mod: 26

## 2025-09-16 PROCEDURE — 76641 ULTRASOUND BREAST COMPLETE: CPT | Mod: 26,50,GA
